# Patient Record
Sex: FEMALE | Race: WHITE | NOT HISPANIC OR LATINO | Employment: FULL TIME | URBAN - METROPOLITAN AREA
[De-identification: names, ages, dates, MRNs, and addresses within clinical notes are randomized per-mention and may not be internally consistent; named-entity substitution may affect disease eponyms.]

---

## 2017-05-08 DIAGNOSIS — N20.0 KIDNEY STONE: ICD-10-CM

## 2017-05-09 RX ORDER — HYDROCHLOROTHIAZIDE 25 MG/1
TABLET ORAL
Qty: 30 TABLET | Refills: 0 | Status: SHIPPED | OUTPATIENT
Start: 2017-05-09 | End: 2017-06-10

## 2017-05-09 NOTE — TELEPHONE ENCOUNTER
Medication is being filled for 1 time refill only due to:  Patient needs labs non-fasting labs. Patient needs to be seen because it has been more than one year since last visit.      HCTZ      Last Written Prescription Date: 3/31/16  Last Fill Quantity: 90, # refills: 3  Last Office Visit with [facility], [facility] or [facility] prescribing provider: 3/31/16       Potassium   Date Value Ref Range Status   03/31/2016 3.4 3.4 - 5.3 mmol/L Final     Creatinine   Date Value Ref Range Status   03/31/2016 0.75 0.52 - 1.04 mg/dL Final     BP Readings from Last 3 Encounters:   03/31/16 104/70   10/21/14 104/52   02/10/10 102/64

## 2017-06-08 DIAGNOSIS — N20.0 KIDNEY STONE: ICD-10-CM

## 2017-06-08 RX ORDER — HYDROCHLOROTHIAZIDE 25 MG/1
TABLET ORAL
Qty: 30 TABLET | Refills: 0 | OUTPATIENT
Start: 2017-06-08

## 2017-06-08 NOTE — TELEPHONE ENCOUNTER
Routing refill request to provider for review/approval because:  Anabella given x1 and patient did not follow up, please advise  Labs not current:  bmp  Patient needs to be seen because it has been more than 1 year since last office visit.  Last seen 3/2016

## 2017-06-10 DIAGNOSIS — N20.0 KIDNEY STONE: ICD-10-CM

## 2017-06-10 RX ORDER — HYDROCHLOROTHIAZIDE 25 MG/1
TABLET ORAL
Qty: 30 TABLET | Refills: 0 | Status: CANCELLED | OUTPATIENT
Start: 2017-06-10

## 2017-06-10 RX ORDER — HYDROCHLOROTHIAZIDE 25 MG/1
TABLET ORAL
Qty: 30 TABLET | Refills: 0 | Status: SHIPPED | OUTPATIENT
Start: 2017-06-10 | End: 2017-06-29

## 2017-06-11 NOTE — TELEPHONE ENCOUNTER
Call pt. Overdue for appt with MD  to follow-up on his/her  Hx kidney stones, hydrochlorothiazide use and lab monitoring. Medications will be refilled for 30 days and no additional refill at this time. Pt to schedule an appointment for nonfasting labs in the next 2 weeks and appt to see me in the next 30 days (after labs done) to review results and follow-up on medical status . Additional   longterm refills will be addressed at that follow-up appointment in clinic.

## 2017-06-29 ENCOUNTER — OFFICE VISIT (OUTPATIENT)
Dept: INTERNAL MEDICINE | Facility: CLINIC | Age: 47
End: 2017-06-29
Payer: COMMERCIAL

## 2017-06-29 VITALS
TEMPERATURE: 99 F | HEART RATE: 87 BPM | DIASTOLIC BLOOD PRESSURE: 72 MMHG | OXYGEN SATURATION: 97 % | WEIGHT: 183 LBS | HEIGHT: 66 IN | BODY MASS INDEX: 29.41 KG/M2 | SYSTOLIC BLOOD PRESSURE: 104 MMHG

## 2017-06-29 DIAGNOSIS — Z13.6 CARDIOVASCULAR SCREENING; LDL GOAL LESS THAN 130: Primary | ICD-10-CM

## 2017-06-29 DIAGNOSIS — Z86.19 HISTORY OF COLD SORES: ICD-10-CM

## 2017-06-29 DIAGNOSIS — N20.0 KIDNEY STONE: ICD-10-CM

## 2017-06-29 LAB
ANION GAP SERPL CALCULATED.3IONS-SCNC: 7 MMOL/L (ref 3–14)
BUN SERPL-MCNC: 13 MG/DL (ref 7–30)
CALCIUM SERPL-MCNC: 8.8 MG/DL (ref 8.5–10.1)
CHLORIDE SERPL-SCNC: 104 MMOL/L (ref 94–109)
CHOLEST SERPL-MCNC: 179 MG/DL
CO2 SERPL-SCNC: 27 MMOL/L (ref 20–32)
CREAT SERPL-MCNC: 0.83 MG/DL (ref 0.52–1.04)
GFR SERPL CREATININE-BSD FRML MDRD: 74 ML/MIN/1.7M2
GLUCOSE SERPL-MCNC: 104 MG/DL (ref 70–99)
HDLC SERPL-MCNC: 42 MG/DL
LDLC SERPL CALC-MCNC: 112 MG/DL
NONHDLC SERPL-MCNC: 137 MG/DL
POTASSIUM SERPL-SCNC: 3.5 MMOL/L (ref 3.4–5.3)
SODIUM SERPL-SCNC: 138 MMOL/L (ref 133–144)
TRIGL SERPL-MCNC: 125 MG/DL

## 2017-06-29 PROCEDURE — 99213 OFFICE O/P EST LOW 20 MIN: CPT | Performed by: INTERNAL MEDICINE

## 2017-06-29 PROCEDURE — 80061 LIPID PANEL: CPT | Performed by: INTERNAL MEDICINE

## 2017-06-29 PROCEDURE — 36415 COLL VENOUS BLD VENIPUNCTURE: CPT | Performed by: INTERNAL MEDICINE

## 2017-06-29 PROCEDURE — 80048 BASIC METABOLIC PNL TOTAL CA: CPT | Performed by: INTERNAL MEDICINE

## 2017-06-29 RX ORDER — VALACYCLOVIR HYDROCHLORIDE 1 G/1
2000 TABLET, FILM COATED ORAL 2 TIMES DAILY
Qty: 30 TABLET | Refills: 2 | Status: SHIPPED | OUTPATIENT
Start: 2017-06-29 | End: 2018-08-09

## 2017-06-29 RX ORDER — HYDROCHLOROTHIAZIDE 25 MG/1
TABLET ORAL
Qty: 90 TABLET | Refills: 3 | Status: SHIPPED | OUTPATIENT
Start: 2017-06-29 | End: 2018-07-03

## 2017-06-29 NOTE — PROGRESS NOTES
"  SUBJECTIVE:                                                    Rakel Davila is a 47 year old female who presents to clinic today for the following health issues:      Medication Renewal    Amount of exercise or physical activity: 4-5 days/week for an average of 15-30 minutes    Problems taking medications regularly: No    Medication side effects: none    Diet: low salt    Pt's past medical history, family history, habits, medications and allergies were reviewed with the patient today.  See snap shot for  HCM status. Most recent lab results reviewed with pt. Problem list and histories reviewed & adjusted, as indicated.  Additional history as below:    Denies chest pain, shortness of breath, abdominal pain, headache, vision changes or side effects with medications.  Nephrology note from Dec 2016 reviewed.  Sees [facility] annually and had exam  Last Fall. Not sure if had pap then. Last done Spring 2014 per abstracted info.  NO recurrence of kidney stones. Rare use of Valtrex for cold sores. No sx now     Additional ROS:   Constitutional, HEENT, Cardiovascular, Pulmonary, GI and , Neuro, MSK and Psych review of systems/symptoms are otherwise negative or unchanged from previous, except as noted above.      OBJECTIVE:  /72  Pulse 87  Temp 99  F (37.2  C) (Oral)  Ht 5' 6\" (1.676 m)  Wt 183 lb (83 kg)  LMP 06/08/2017 (Within Days)  SpO2 97%  BMI 29.54 kg/m2   Estimated body mass index is 29.54 kg/(m^2) as calculated from the following:    Height as of this encounter: 5' 6\" (1.676 m).    Weight as of this encounter: 183 lb (83 kg).  Eye: PERRL, EOMI  HENT: ear canals and TM's normal and nose and mouth without ulcers or lesions   Neck: no adenopathy. Thyroid normal to palpation. No bruits  Pulm: Lungs clear to auscultation   CV: Regular rates and rhythm  GI: Soft, nontender, Normal active bowel sounds, No hepatosplenomegaly or masses palpable  Ext: Peripheral pulses intact. No edema.  Neuro: Normal " strength and tone, sensory exam grossly normal    Assessment/Plan: (See plan discussion below for further details)  1. Kidney stone   No sx  Continue meds  - hydrochlorothiazide (HYDRODIURIL) 25 MG tablet; TAKE 1 TABLET(25 MG) BY MOUTH DAILY.  Dispense: 90 tablet; Refill: 3  - Basic metabolic panel    2. History of cold sores   Rare episodes. Contrinue prn treatment. No sx now  - valACYclovir (VALTREX) 1000 mg tablet; Take 2 tablets (2,000 mg) by mouth 2 times daily for 1 day for cold sore outbreaks  Dispense: 30 tablet; Refill: 2    3. CARDIOVASCULAR SCREENING; LDL GOAL LESS THAN 130  - Lipid panel reflex to direct LDL    Plan discussion:  Continue current meds  Prescriptions refilled.    Labs today as ordered  See GYN this year as scheduled. If pap was not done last year, then ask to have results sent to Tawanna Shanks MD  Internal Medicine Department  [facility]

## 2017-06-29 NOTE — MR AVS SNAPSHOT
"              After Visit Summary   6/29/2017    Rakel Davila    MRN: [id number]           Patient Information     Date Of Birth          1970        Visit Information        Provider Department      6/29/2017 7:30 AM Jann Shanks MD [facility]        Today's Diagnoses     CARDIOVASCULAR SCREENING; LDL GOAL LESS THAN 130    -  1    Kidney stone        History of cold sores           Follow-ups after your visit        Who to contact     If you have questions or need follow up information about today's clinic visit or your schedule please contact [facility] directly at [phone].  Normal or non-critical lab and imaging results will be communicated to you by [employer], letter or phone within 4 business days after the clinic has received the results. If you do not hear from us within 7 days, please contact the clinic through [employer] or phone. If you have a critical or abnormal lab result, we will notify you by phone as soon as possible.  Submit refill requests through [employer] or call your pharmacy and they will forward the refill request to us. Please allow 3 business days for your refill to be completed.          Additional Information About Your Visit        MyChart Information     [employer] gives you secure access to your electronic health record. If you see a primary care provider, you can also send messages to your care team and make appointments. If you have questions, please call your primary care clinic.  If you do not have a primary care provider, please call [phone] and they will assist you.        Care EveryWhere ID     This is your Care EveryWhere ID. This could be used by other organizations to access your [place] medical records  [id number]        Your Vitals Were     Pulse Temperature Height Last Period Pulse Oximetry BMI (Body Mass Index)    87 99  F (37.2  C) (Oral) 5' 6\" (1.676 m) 06/08/2017 (Within Days) 97% 29.54 kg/m2       " Blood Pressure from Last 3 Encounters:   06/29/17 104/72   03/31/16 104/70   10/21/14 104/52    Weight from Last 3 Encounters:   06/29/17 183 lb (83 kg)   03/31/16 181 lb (82.1 kg)   10/21/14 177 lb (80.3 kg)              We Performed the Following     Basic metabolic panel     Lipid panel reflex to direct LDL          Today's Medication Changes          These changes are accurate as of: 6/29/17  8:37 AM.  If you have any questions, ask your nurse or doctor.               These medicines have changed or have updated prescriptions.        Dose/Directions    hydrochlorothiazide 25 MG tablet   Commonly known as:  HYDRODIURIL   This may have changed:  additional instructions   Used for:  Kidney stone   Changed by:  Jann Shanks MD        TAKE 1 TABLET(25 MG) BY MOUTH DAILY.   Quantity:  90 tablet   Refills:  3       valACYclovir 1000 mg tablet   Commonly known as:  VALTREX   This may have changed:  additional instructions   Used for:  History of cold sores   Changed by:  Jann Shanks MD        Dose:  2000 mg   Take 2 tablets (2,000 mg) by mouth 2 times daily for 1 day for cold sore outbreaks   Quantity:  30 tablet   Refills:  2         Stop taking these medicines if you haven't already. Please contact your care team if you have questions.     meclizine 25 MG tablet   Commonly known as:  ANTIVERT   Stopped by:  Jann Shanks MD                Where to get your medicines      [facility] [address] [place] [address] [address] [facility] [address]  [facility], [facility] [address]    Hours:  24-hours Phone:  [phone]     hydrochlorothiazide 25 MG tablet    valACYclovir 1000 mg tablet                Primary Care Provider Office Phone # Fax #    Jann Shanks [phone]       [facility] [address] [facility] [address]        Equal Access to Services     IRIS KING AH: Shaun Caldera, juma haas, jesseybcaesar  meaghan cheungrita granado ah. So [facility] [phone].    ATENCIÓN: Si anne gonzales, tiene a khanna disposición servicios gratuitos de asistencia lingüística. Flory al [phone].    We comply with applicable federal civil rights laws and [place] laws. We do not discriminate on the basis of race, color, national origin, age, disability sex, sexual orientation or gender identity.            Thank you!     Thank you for choosing [facility]  for your care. Our goal is always to provide you with excellent care. Hearing back from our patients is one way we can continue to improve our services. Please take a few minutes to complete the written survey that you may receive in the mail after your visit with us. Thank you!             Your Updated Medication List - Protect others around you: Learn how to safely use, store and throw away your medicines at www.disposemymeds.org.          This list is accurate as of: 6/29/17  8:37 AM.  Always use your most recent med list.                   Brand Name Dispense Instructions for use Diagnosis    hydrochlorothiazide 25 MG tablet    HYDRODIURIL    90 tablet    TAKE 1 TABLET(25 MG) BY MOUTH DAILY.    Kidney stone       valACYclovir 1000 mg tablet    VALTREX    30 tablet    Take 2 tablets (2,000 mg) by mouth 2 times daily for 1 day for cold sore outbreaks    History of cold sores

## 2017-06-29 NOTE — NURSING NOTE
"Chief Complaint   Patient presents with     Refill Request       Initial /72  Pulse 87  Temp 99  F (37.2  C) (Oral)  Ht 5' 6\" (1.676 m)  Wt 183 lb (83 kg)  LMP 06/08/2017 (Within Days)  SpO2 97%  BMI 29.54 kg/m2 Estimated body mass index is 29.54 kg/(m^2) as calculated from the following:    Height as of this encounter: 5' 6\" (1.676 m).    Weight as of this encounter: 183 lb (83 kg).  Medication Reconciliation: complete  "

## 2017-06-30 ENCOUNTER — MYC MEDICAL ADVICE (OUTPATIENT)
Dept: URGENT CARE | Facility: URGENT CARE | Age: 47
End: 2017-06-30

## 2017-06-30 DIAGNOSIS — E78.5 HYPERLIPIDEMIA LDL GOAL <130: ICD-10-CM

## 2017-06-30 DIAGNOSIS — N20.0 KIDNEY STONE: Primary | ICD-10-CM

## 2018-03-23 ENCOUNTER — HOSPITAL ENCOUNTER (OUTPATIENT)
Dept: MAMMOGRAPHY | Facility: CLINIC | Age: 48
Discharge: HOME | End: 2018-03-23
Attending: REGISTERED NURSE | Admitting: REGISTERED NURSE
Payer: COMMERCIAL

## 2018-03-23 DIAGNOSIS — Z12.31 VISIT FOR SCREENING MAMMOGRAM: ICD-10-CM

## 2018-03-23 PROCEDURE — 77067 SCR MAMMO BI INCL CAD: CPT

## 2018-07-03 DIAGNOSIS — N20.0 KIDNEY STONE: ICD-10-CM

## 2018-07-03 RX ORDER — HYDROCHLOROTHIAZIDE 25 MG/1
TABLET ORAL
Qty: 30 TABLET | Refills: 0 | Status: SHIPPED | OUTPATIENT
Start: 2018-07-03 | End: 2018-08-02

## 2018-07-03 NOTE — LETTER
[facility]  [address] [address]  [phone]            Rakel Davila  [address]  [facility] [address]        July 3, 2018    Dear Brittaney,    While refilling your prescription today, we noticed that you are due for an appointment with your provider.  We will refill your prescription for 30 days, but a follow-up appointment must be made before any additional refills can be approved.     Taking care of your health is important to us and we look forward to seeing you in the near future.  Please call us at [phone] or [phone] (or use [employer]) to schedule an appointment.     Please disregard this notice if you have already made an appointment.    Sincerely,        [facility]

## 2018-07-03 NOTE — TELEPHONE ENCOUNTER
"Requested Prescriptions   Pending Prescriptions Disp Refills     hydrochlorothiazide (HYDRODIURIL) 25 MG tablet [Pharmacy Med Name: HYDROCHLOROTHIAZIDE 25MG TABLETS] 90 tablet 0     Sig: TAKE 1 TABLET(25 MG) BY MOUTH DAILY    Diuretics (Including Combos) Protocol Failed    7/3/2018 11:01 AM       Failed - Blood pressure under 140/90 in past 12 months    BP Readings from Last 3 Encounters:   06/29/17 104/72   03/31/16 104/70   10/21/14 104/52                Failed - Recent (12 mo) or future (30 days) visit within the authorizing provider's specialty    Patient had office visit in the last 12 months or has a visit in the next 30 days with authorizing provider or within the authorizing provider's specialty.  See \"Patient Info\" tab in inbasket, or \"Choose Columns\" in Meds & Orders section of the refill encounter.           Failed - Normal serum creatinine on file in past 12 months    Recent Labs   Lab Test  06/29/17   0802   CR  0.83             Failed - Normal serum potassium on file in past 12 months    Recent Labs   Lab Test  06/29/17   0802   POTASSIUM  3.5                   Failed - Normal serum sodium on file in past 12 months    Recent Labs   Lab Test  06/29/17   0802   NA  138             Passed - Patient is age 18 or older       Passed - No active pregancy on record       Passed - No positive pregnancy test in past 12 months        Last Written Prescription Date:  6/29/17  Last Fill Quantity: 90,  # refills: 3   Last office visit: 6/29/2017 with prescribing provider:  6/29/17   Future Office Visit:      "

## 2018-07-12 ENCOUNTER — MYC MEDICAL ADVICE (OUTPATIENT)
Dept: INTERNAL MEDICINE | Facility: CLINIC | Age: 48
End: 2018-07-12

## 2018-08-02 DIAGNOSIS — N20.0 KIDNEY STONE: ICD-10-CM

## 2018-08-02 RX ORDER — HYDROCHLOROTHIAZIDE 25 MG/1
TABLET ORAL
Qty: 8 TABLET | Refills: 0 | Status: SHIPPED | OUTPATIENT
Start: 2018-08-02 | End: 2018-08-09

## 2018-08-02 NOTE — TELEPHONE ENCOUNTER
Patient called and was wondering if she could get a few hydrochlorothiazide tablets until her appt  8/9 with dr. Shanks.  She will be out in a few days.  Home # [phone]   Pharmacy #[phone]

## 2018-08-03 DIAGNOSIS — N20.0 KIDNEY STONE: ICD-10-CM

## 2018-08-03 RX ORDER — HYDROCHLOROTHIAZIDE 25 MG/1
TABLET ORAL
Qty: 30 TABLET | Refills: 0 | OUTPATIENT
Start: 2018-08-03

## 2018-08-03 NOTE — TELEPHONE ENCOUNTER
"Requested Prescriptions   Pending Prescriptions Disp Refills     hydrochlorothiazide (HYDRODIURIL) 25 MG tablet [Pharmacy Med Name: HYDROCHLOROTHIAZIDE 25MG TABLETS] 30 tablet 0    Last Written Prescription Date:  08/02/2018  Last Fill Quantity: 8,  # refills: 0   Last office visit: 6/29/2017 with prescribing provider:  Dimitris    Future Office Visit:   Next 5 appointments (look out 90 days)     Aug 09, 2018  1:30 PM CDT   PHYSICAL with Jann Shanks MD   [facility]    [address] [place] [address] [phone]                  Sig: TAKE 1 TABLET BY MOUTH EVERY DAY    Diuretics (Including Combos) Protocol Failed    8/3/2018  7:32 AM       Failed - Blood pressure under 140/90 in past 12 months    BP Readings from Last 3 Encounters:   06/29/17 104/72   03/31/16 104/70   10/21/14 104/52                Failed - Normal serum creatinine on file in past 12 months    Recent Labs   Lab Test  06/29/17   0802   CR  0.83             Failed - Normal serum potassium on file in past 12 months    Recent Labs   Lab Test  06/29/17   0802   POTASSIUM  3.5                   Failed - Normal serum sodium on file in past 12 months    Recent Labs   Lab Test  06/29/17   0802   NA  138             Passed - Recent (12 mo) or future (30 days) visit within the authorizing provider's specialty    Patient had office visit in the last 12 months or has a visit in the next 30 days with authorizing provider or within the authorizing provider's specialty.  See \"Patient Info\" tab in inbasket, or \"Choose Columns\" in Meds & Orders section of the refill encounter.           Passed - Patient is age 18 or older       Passed - No active pregancy on record       Passed - No positive pregnancy test in past 12 months          "

## 2018-08-04 ENCOUNTER — HEALTH MAINTENANCE LETTER (OUTPATIENT)
Age: 48
End: 2018-08-04

## 2018-08-09 ENCOUNTER — OFFICE VISIT (OUTPATIENT)
Dept: INTERNAL MEDICINE | Facility: CLINIC | Age: 48
End: 2018-08-09
Payer: COMMERCIAL

## 2018-08-09 VITALS
WEIGHT: 187 LBS | DIASTOLIC BLOOD PRESSURE: 72 MMHG | OXYGEN SATURATION: 99 % | RESPIRATION RATE: 16 BRPM | TEMPERATURE: 98.7 F | SYSTOLIC BLOOD PRESSURE: 108 MMHG | HEART RATE: 88 BPM | BODY MASS INDEX: 30.05 KG/M2 | HEIGHT: 66 IN

## 2018-08-09 DIAGNOSIS — E66.811 CLASS 1 OBESITY WITHOUT SERIOUS COMORBIDITY WITH BODY MASS INDEX (BMI) OF 30.0 TO 30.9 IN ADULT, UNSPECIFIED OBESITY TYPE: ICD-10-CM

## 2018-08-09 DIAGNOSIS — N20.0 KIDNEY STONE: ICD-10-CM

## 2018-08-09 DIAGNOSIS — Z86.19 HISTORY OF COLD SORES: ICD-10-CM

## 2018-08-09 DIAGNOSIS — Z00.01 ENCOUNTER FOR ROUTINE ADULT MEDICAL EXAM WITH ABNORMAL FINDINGS: Primary | ICD-10-CM

## 2018-08-09 DIAGNOSIS — E78.5 HYPERLIPIDEMIA LDL GOAL <130: ICD-10-CM

## 2018-08-09 PROCEDURE — 99396 PREV VISIT EST AGE 40-64: CPT | Performed by: INTERNAL MEDICINE

## 2018-08-09 RX ORDER — HYDROCHLOROTHIAZIDE 25 MG/1
TABLET ORAL
Qty: 8 TABLET | Refills: 0 | Status: CANCELLED | OUTPATIENT
Start: 2018-08-09

## 2018-08-09 RX ORDER — VALACYCLOVIR HYDROCHLORIDE 1 G/1
2000 TABLET, FILM COATED ORAL 2 TIMES DAILY
Qty: 30 TABLET | Refills: 2 | Status: SHIPPED | OUTPATIENT
Start: 2018-08-09 | End: 2019-09-23

## 2018-08-09 RX ORDER — HYDROCHLOROTHIAZIDE 25 MG/1
TABLET ORAL
Qty: 90 TABLET | Refills: 3 | Status: SHIPPED | OUTPATIENT
Start: 2018-08-09 | End: 2019-08-07

## 2018-08-09 NOTE — MR AVS SNAPSHOT
After Visit Summary   8/9/2018    Rakel Davila    MRN: [id number]           Patient Information     Date Of Birth          1970        Visit Information        Provider Department      8/9/2018 1:30 PM Jann Shanks MD [facility]        Today's Diagnoses     Screening for malignant neoplasm of cervix        Screening for HIV (human immunodeficiency virus)        History of cold sores        Kidney stone          Care Instructions    Continue current meds  Prescriptions refilled.    Call  [phone] or use [employer] to schedule a future lab appointment  fasting in 1-2 weeks.   Calorie/carbohydrate (sugar, bread, potato, pasta, etc) reduction in diet for weight loss.  Increase color on your plate with fruits and vegetables. Increase  frequency of walking or other aerobic exercise as able (goal is daily)  Pap/pelvic exam with GYN. Ask them to fax us the results of pap/HPV          Follow-ups after your visit        Who to contact     If you have questions or need follow up information about today's clinic visit or your schedule please contact [facility] directly at [phone].  Normal or non-critical lab and imaging results will be communicated to you by [employer], letter or phone within 4 business days after the clinic has received the results. If you do not hear from us within 7 days, please contact the clinic through [employer] or phone. If you have a critical or abnormal lab result, we will notify you by phone as soon as possible.  Submit refill requests through [employer] or call your pharmacy and they will forward the refill request to us. Please allow 3 business days for your refill to be completed.          Additional Information About Your Visit        [employer] Information     [employer] gives you secure access to your electronic health record. If you see a primary care provider, you can also send messages to your care team and make  "appointments. If you have questions, please call your primary care clinic.  If you do not have a primary care provider, please call [phone] and they will assist you.        Care EveryWhere ID     This is your Care EveryWhere ID. This could be used by other organizations to access your [place] medical records  [id number]        Your Vitals Were     Pulse Temperature Respirations Height Pulse Oximetry BMI (Body Mass Index)    88 98.7  F (37.1  C) (Oral) 16 5' 5.5\" (1.664 m) 99% 30.65 kg/m2       Blood Pressure from Last 3 Encounters:   08/09/18 108/72   06/29/17 104/72   03/31/16 104/70    Weight from Last 3 Encounters:   08/09/18 187 lb (84.8 kg)   06/29/17 183 lb (83 kg)   03/31/16 181 lb (82.1 kg)              Today, you had the following     No orders found for display         Where to get your medicines      These medications were sent to [facility] [address] [place] [address] [address] AT [place] [address] [address], [facility] [address]     Phone:  [phone]     hydrochlorothiazide 25 MG tablet    valACYclovir 1000 mg tablet          Primary Care Provider Office Phone # Fax #    Jann Shanks [phone]       [address] [facility] [address]        Equal Access to Services     IRIS KING AH: Hadii jessica ku hadasho Soomaali, waaxda luqadaha, qaybta kaalmada adeegyada, meaghna jim. So [facility] [phone].    ATENCIÓN: Si habla español, tiene a khanna disposición servicios gratuitos de asistencia lingüística. Flory mallory [phone].    We comply with applicable federal civil rights laws and [place] laws. We do not discriminate on the basis of race, color, national origin, age, disability, sex, sexual orientation, or gender identity.            Thank you!     Thank you for choosing [facility]  for your care. Our goal is always to provide you with excellent care. Hearing back from our patients is one way " we can continue to improve our services. Please take a few minutes to complete the written survey that you may receive in the mail after your visit with us. Thank you!             Your Updated Medication List - Protect others around you: Learn how to safely use, store and throw away your medicines at www.disposemymeds.org.          This list is accurate as of 8/9/18  2:00 PM.  Always use your most recent med list.                   Brand Name Dispense Instructions for use Diagnosis    hydrochlorothiazide 25 MG tablet    HYDRODIURIL    90 tablet    TAKE 1 TABLET(25 MG) BY MOUTH DAILY    Kidney stone       valACYclovir 1000 mg tablet    VALTREX    30 tablet    Take 2 tablets (2,000 mg) by mouth 2 times daily for 1 day for cold sore outbreaks    History of cold sores

## 2018-08-09 NOTE — PROGRESS NOTES
SUBJECTIVE:   CC: Rakel Davila is an 48 year old woman who presents for preventive health visit.     Healthy Habits:  Answers for HPI/ROS submitted by the patient on 8/9/2018   Annual Exam:  Getting at least 3 servings of Calcium per day:: Yes  Bi-annual eye exam:: Yes  Dental care twice a year:: Yes  Sleep apnea or symptoms of sleep apnea:: None  Diet:: Low salt  Frequency of exercise:: 2-3 days/week  Taking medications regularly:: Yes  Medication side effects:: None  Additional concerns today:: YES  PHQ-2 Score: 0  Duration of exercise:: 15-30 minutes        Today's PHQ-2 Score:   PHQ-2 ( 1999 [employer]) 8/9/2018 6/29/2017   Q1: Little interest or pleasure in doing things 0 0   Q2: Feeling down, depressed or hopeless 0 0   PHQ-2 Score 0 0   Q1: Little interest or pleasure in doing things Not at all -   Q2: Feeling down, depressed or hopeless Not at all -   PHQ-2 Score 0 -       Abuse: Current or Past(Physical, Sexual or Emotional)- No  Do you feel safe in your environment - Yes    Social History   Substance Use Topics     Smoking status: Never Smoker     Smokeless tobacco: Never Used     Alcohol use Yes      Comment: four per month     If you drink alcohol do you typically have >3 drinks per day or >7 drinks per week? No                     Reviewed orders with patient.  Reviewed health maintenance and updated orders accordingly - Yes  Labs reviewed in EPIC    Patient under age 50, mutual decision reflected in health maintenance.      Pertinent mammograms are reviewed under the imaging tab.  History of abnormal Pap smear: NO - age 30-65 PAP every 5 years with negative HPV co-testing recommended     Reviewed and updated as needed this visit by clinical staff  Tobacco  Meds  Soc Hx        Reviewed and updated as needed this visit by Provider            ROS:  CONSTITUTIONAL: NEGATIVE for fever, chills. Weight up 4 pounds  INTEGUMENTARU/SKIN: NEGATIVE for worrisome rashes, moles or lesions  EYES: NEGATIVE  "for vision changes or irritation. Eye exam 1 year ago  ENT: NEGATIVE for ear, mouth and throat problems  RESP: NEGATIVE for significant cough or SOB  BREAST: NEGATIVE for masses, tenderness or discharge  CV: NEGATIVE for chest pain, palpitations or peripheral edema.    GI: NEGATIVE for nausea, abdominal pain  or change in bowel habits. Rare GERD with certain food  : NEGATIVE for unusual urinary or vaginal symptoms. Periods are regular. Overdue for GYN/PAP. Sees [facility]  MUSCULOSKELETAL: NEGATIVE for significant arthralgias or myalgia  NEURO: NEGATIVE for weakness, dizziness or paresthesias  PSYCHIATRIC: NEGATIVE for changes in mood or affect    OBJECTIVE:   /72  Pulse 88  Temp 98.7  F (37.1  C) (Oral)  Resp 16  Ht 5' 5.5\" (1.664 m)  Wt 187 lb (84.8 kg)  SpO2 99%  BMI 30.65 kg/m2  EXAM:  General appearance - healthy, alert, no distress  Skin - No rashes or lesions. Few seborrheic keratosis on trunk and legs  Head - normocephalic, atraumatic  Eyes - MUKESH, EOMI, fundi exam with nondilated pupils negative.  Ears - External ears normal. Canals clear. TM's normal.  Nose/Sinuses - Nares normal. Septum midline. Mucosa normal. No drainage or sinus tenderness.  Oropharynx - No erythema, no adenopathy, no exudates.  Neck - Supple without adenopathy or thyromegaly. No bruits.  Lungs - Clear to auscultation without wheezes/rhonchi.  Heart - Regular rate and rhythm without murmurs, clicks, or gallops.  Nodes - No supraclavicular, axillary, or inguinal adenopathy palpable.  Breasts - deferred  Abdomen - Abdomen soft, non-tender. BS normal. No masses or hepatosplenomegaly palpable. No bruits.  Extremities -No cyanosis, clubbing or edema.    Musculoskeletal - Spine ROM normal. Muscular strength intact.   Peripheral pulses - radial=4/4, femoral=4/4, posterior tibial=4/4, dorsalis pedis=4/4,  Neuro - Gait normal. Reflexes normal and symmetric. Sensation grossly WNL.  Genital/Rectal - deferred      ASSESSMENT/PLAN:    " "1. Encounter for routine adult medical exam with abnormal findings   Due for pap/pelvic. Will have done with GYN in the next month and have tem fax us results    2. Class 1 obesity without serious comorbidity with body mass index (BMI) of 30.0 to 30.9 in adult, unspecified obesity type  See below re: counselling    3. History of cold sores  NO sx now. Valtrex has worked well when occurs. RF done  - valACYclovir (VALTREX) 1000 mg tablet; Take 2 tablets (2,000 mg) by mouth 2 times daily for 1 day for cold sore outbreaks  Dispense: 30 tablet; Refill: 2    4. Kidney stone  No recurrence with preventative therapy.Maintaining good hydration. BMP lab in next 2 weeks as previously ordered  - hydrochlorothiazide (HYDRODIURIL) 25 MG tablet; TAKE 1 TABLET(25 MG) BY MOUTH DAILY  Dispense: 90 tablet; Refill: 3  hlorothiazide (HYDRODIURIL) 25 MG tablet; TAKE 1 TABLET(25 MG) BY MOUTH DAILY  Dispense: 90 tablet; Refill: 3    5. Hyperlipidemia LDL goal <130   Due for lab f/u. Orders previously done      COUNSELING:   Reviewed preventive health counseling, as reflected in patient instructions  Special attention given to:        Regular exercise       Healthy diet/nutrition    BP Readings from Last 1 Encounters:   08/09/18 108/72     Estimated body mass index is 30.65 kg/(m^2) as calculated from the following:    Height as of this encounter: 5' 5.5\" (1.664 m).    Weight as of this encounter: 187 lb (84.8 kg).      Weight management plan: Discussed healthy diet and exercise guidelines and patient will follow up in 12 months in clinic to re-evaluate.     reports that she has never smoked. She has never used smokeless tobacco.      Counseling Resources:  ATP IV Guidelines  Pooled Cohorts Equation Calculator  Breast Cancer Risk Calculator  FRAX Risk Assessment  ICSI Preventive Guidelines  Dietary Guidelines for Americans, 2010  USDA's MyPlate  ASA Prophylaxis  Lung CA Screening    PLAN:  Continue current meds  Prescriptions refilled.  "   Call  [phone] or use [facility] to schedule a future lab appointment  fasting in 1-2 weeks.   Calorie/carbohydrate (sugar, bread, potato, pasta, etc) reduction in diet for weight loss.  Increase color on your plate with fruits and vegetables. Increase  frequency of walking or other aerobic exercise as able (goal is daily)  Pap/pelvic exam with GYN. Ask them to fax us the results of pap/HPV         Jann Shanks MD  [facility]

## 2018-08-09 NOTE — PATIENT INSTRUCTIONS
Continue current meds  Prescriptions refilled.    Call  [phone] or use [facility] to schedule a future lab appointment  fasting in 1-2 weeks.   Calorie/carbohydrate (sugar, bread, potato, pasta, etc) reduction in diet for weight loss.  Increase color on your plate with fruits and vegetables. Increase  frequency of walking or other aerobic exercise as able (goal is daily)  Pap/pelvic exam with GYN. Ask them to fax us the results of pap/HPV

## 2018-08-09 NOTE — TELEPHONE ENCOUNTER
"Requested Prescriptions   Pending Prescriptions Disp Refills     hydrochlorothiazide (HYDRODIURIL) 25 MG tablet [Pharmacy Med Name: HYDROCHLOROTHIAZIDE 25MG TABLETS] 8 tablet 0    Last Written Prescription Date:  8/2/18  Last Fill Quantity: 8,  # refills: 0   Last office visit: No previous visit found with prescribing provider:  6/29/17   Future Office Visit:   Next 5 appointments (look out 90 days)     Aug 09, 2018  1:30 PM CDT   PHYSICAL with Jann Shanks MD   [facility]    [address] [place] [id number] [phone]                  Sig: TAKE 1 TABLET(25 MG) BY MOUTH DAILY    Diuretics (Including Combos) Protocol Failed    8/9/2018  3:11 AM       Failed - Blood pressure under 140/90 in past 12 months    BP Readings from Last 3 Encounters:   06/29/17 104/72   03/31/16 104/70   10/21/14 104/52                Failed - Normal serum creatinine on file in past 12 months    Recent Labs   Lab Test  06/29/17   0802   CR  0.83             Failed - Normal serum potassium on file in past 12 months    Recent Labs   Lab Test  06/29/17   0802   POTASSIUM  3.5                   Failed - Normal serum sodium on file in past 12 months    Recent Labs   Lab Test  06/29/17   0802   NA  138             Passed - Recent (12 mo) or future (30 days) visit within the authorizing provider's specialty    Patient had office visit in the last 12 months or has a visit in the next 30 days with authorizing provider or within the authorizing provider's specialty.  See \"Patient Info\" tab in inbasket, or \"Choose Columns\" in Meds & Orders section of the refill encounter.           Passed - Patient is age 18 or older       Passed - No active pregancy on record       Passed - No positive pregnancy test in past 12 months          "

## 2018-08-14 DIAGNOSIS — E78.5 HYPERLIPIDEMIA LDL GOAL <130: ICD-10-CM

## 2018-08-14 DIAGNOSIS — N20.0 KIDNEY STONE: ICD-10-CM

## 2018-08-14 LAB
ANION GAP SERPL CALCULATED.3IONS-SCNC: 8 MMOL/L (ref 3–14)
BUN SERPL-MCNC: 9 MG/DL (ref 7–30)
CALCIUM SERPL-MCNC: 8.4 MG/DL (ref 8.5–10.1)
CHLORIDE SERPL-SCNC: 102 MMOL/L (ref 94–109)
CHOLEST SERPL-MCNC: 190 MG/DL
CO2 SERPL-SCNC: 28 MMOL/L (ref 20–32)
CREAT SERPL-MCNC: 0.83 MG/DL (ref 0.52–1.04)
GFR SERPL CREATININE-BSD FRML MDRD: 73 ML/MIN/1.7M2
GLUCOSE SERPL-MCNC: 105 MG/DL (ref 70–99)
HDLC SERPL-MCNC: 38 MG/DL
LDLC SERPL CALC-MCNC: 125 MG/DL
NONHDLC SERPL-MCNC: 152 MG/DL
POTASSIUM SERPL-SCNC: 3.5 MMOL/L (ref 3.4–5.3)
SODIUM SERPL-SCNC: 138 MMOL/L (ref 133–144)
TRIGL SERPL-MCNC: 136 MG/DL

## 2018-08-14 PROCEDURE — 80061 LIPID PANEL: CPT | Performed by: INTERNAL MEDICINE

## 2018-08-14 PROCEDURE — 36415 COLL VENOUS BLD VENIPUNCTURE: CPT | Performed by: INTERNAL MEDICINE

## 2018-08-14 PROCEDURE — 80048 BASIC METABOLIC PNL TOTAL CA: CPT | Performed by: INTERNAL MEDICINE

## 2019-03-05 ENCOUNTER — TELEPHONE (OUTPATIENT)
Dept: INTERNAL MEDICINE | Facility: CLINIC | Age: 49
End: 2019-03-05

## 2019-03-05 NOTE — TELEPHONE ENCOUNTER
Panel Management Review    Patient Active Problem List   Diagnosis     Allergic rhinitis     Dyshidrosis     History of cold sores     Kidney stone     Vitamin D deficiency     Hyperlipidemia LDL goal <130       Patient has the following on her problem list: None      Composite cancer screening  Chart review shows that this patient is due/due soon for the following Pap Smear  Summary:    Patient is due/failing the following:   PAP    Action needed:   Patient needs office visit for PAP.    Type of outreach:    Phone, spoke to patient.  Patient is seen at [facility]    Questions for provider review:    None                                                                                                                                    Lucia Urias CMA       Chart routed to none .

## 2019-03-13 ENCOUNTER — TRANSFERRED RECORDS (OUTPATIENT)
Dept: HEALTH INFORMATION MANAGEMENT | Facility: CLINIC | Age: 49
End: 2019-03-13

## 2019-03-13 LAB
HPV ABSTRACT: NORMAL
PAP-ABSTRACT: NORMAL

## 2019-04-02 ENCOUNTER — TRANSFERRED RECORDS (OUTPATIENT)
Dept: HEALTH INFORMATION MANAGEMENT | Facility: CLINIC | Age: 49
End: 2019-04-02

## 2019-05-14 ENCOUNTER — HOSPITAL ENCOUNTER (OUTPATIENT)
Dept: MAMMOGRAPHY | Facility: CLINIC | Age: 49
Discharge: HOME | End: 2019-05-14
Attending: REGISTERED NURSE | Admitting: REGISTERED NURSE
Payer: COMMERCIAL

## 2019-05-14 DIAGNOSIS — Z12.39 BREAST CANCER SCREENING: ICD-10-CM

## 2019-05-14 PROCEDURE — 77063 BREAST TOMOSYNTHESIS BI: CPT

## 2019-08-07 DIAGNOSIS — N20.0 KIDNEY STONE: ICD-10-CM

## 2019-08-07 RX ORDER — HYDROCHLOROTHIAZIDE 25 MG/1
TABLET ORAL
Qty: 30 TABLET | Refills: 0 | Status: SHIPPED | OUTPATIENT
Start: 2019-08-07 | End: 2019-09-11

## 2019-08-07 NOTE — TELEPHONE ENCOUNTER
"Requested Prescriptions   Pending Prescriptions Disp Refills     hydrochlorothiazide (HYDRODIURIL) 25 MG tablet [Pharmacy Med Name: HYDROCHLOROTHIAZIDE 25MG TABLETS] 90 tablet 0     Sig: TAKE 1 TABLET(25 MG) BY MOUTH DAILY       Diuretics (Including Combos) Protocol Passed - 8/7/2019  6:40 AM        Passed - Blood pressure under 140/90 in past 12 months     BP Readings from Last 3 Encounters:   08/09/18 108/72   06/29/17 104/72   03/31/16 104/70                 Passed - Recent (12 mo) or future (30 days) visit within the authorizing provider's specialty     Patient had office visit in the last 12 months or has a visit in the next 30 days with authorizing provider or within the authorizing provider's specialty.  See \"Patient Info\" tab in inbasket, or \"Choose Columns\" in Meds & Orders section of the refill encounter.              Passed - Medication is active on med list        Passed - Patient is age 18 or older        Passed - No active pregancy on record        Passed - Normal serum creatinine on file in past 12 months     Recent Labs   Lab Test 08/14/18  0809   CR 0.83              Passed - Normal serum potassium on file in past 12 months     Recent Labs   Lab Test 08/14/18  0809   POTASSIUM 3.5                    Passed - Normal serum sodium on file in past 12 months     Recent Labs   Lab Test 08/14/18  0809                 Passed - No positive pregnancy test in past 12 months        Medication is being filled for 1 time refill only due to:  Patient needs to be seen because it has been more than one year since last visit.    "

## 2019-09-11 DIAGNOSIS — N20.0 KIDNEY STONE: ICD-10-CM

## 2019-09-11 RX ORDER — HYDROCHLOROTHIAZIDE 25 MG/1
TABLET ORAL
Qty: 30 TABLET | Refills: 0 | Status: SHIPPED | OUTPATIENT
Start: 2019-09-11 | End: 2019-09-23

## 2019-09-11 NOTE — TELEPHONE ENCOUNTER
"Requested Prescriptions   Pending Prescriptions Disp Refills     hydrochlorothiazide (HYDRODIURIL) 25 MG tablet [Pharmacy Med Name: HYDROCHLOROTHIAZIDE 25MG TABLETS] 30 tablet 0     Sig: TAKE 1 TABLET BY MOUTH DAILY       Diuretics (Including Combos) Protocol Failed - 9/11/2019  3:11 AM        Failed - Blood pressure under 140/90 in past 12 months     BP Readings from Last 3 Encounters:   08/09/18 108/72   06/29/17 104/72   03/31/16 104/70                 Failed - Normal serum creatinine on file in past 12 months     Recent Labs   Lab Test 08/14/18  0809   CR 0.83              Failed - Normal serum potassium on file in past 12 months     Recent Labs   Lab Test 08/14/18  0809   POTASSIUM 3.5                    Failed - Normal serum sodium on file in past 12 months     Recent Labs   Lab Test 08/14/18  0809                 Passed - Recent (12 mo) or future (30 days) visit within the authorizing provider's specialty     Patient had office visit in the last 12 months or has a visit in the next 30 days with authorizing provider or within the authorizing provider's specialty.  See \"Patient Info\" tab in inbasket, or \"Choose Columns\" in Meds & Orders section of the refill encounter.              Passed - Medication is active on med list        Passed - Patient is age 18 or older        Passed - No active pregancy on record        Passed - No positive pregnancy test in past 12 months      Routing refill request to provider for review/approval because:  Anabella given x1 and patient did not follow up, please advise  Patient needs to be seen because it has been more than 1 year since last office visit.        "

## 2019-09-23 ENCOUNTER — OFFICE VISIT (OUTPATIENT)
Dept: INTERNAL MEDICINE | Facility: CLINIC | Age: 49
End: 2019-09-23
Payer: COMMERCIAL

## 2019-09-23 ENCOUNTER — MYC MEDICAL ADVICE (OUTPATIENT)
Dept: INTERNAL MEDICINE | Facility: CLINIC | Age: 49
End: 2019-09-23

## 2019-09-23 VITALS
DIASTOLIC BLOOD PRESSURE: 80 MMHG | OXYGEN SATURATION: 97 % | HEIGHT: 65 IN | WEIGHT: 184 LBS | HEART RATE: 80 BPM | SYSTOLIC BLOOD PRESSURE: 120 MMHG | BODY MASS INDEX: 30.66 KG/M2 | RESPIRATION RATE: 16 BRPM | TEMPERATURE: 98.4 F

## 2019-09-23 DIAGNOSIS — N20.0 KIDNEY STONE: ICD-10-CM

## 2019-09-23 DIAGNOSIS — Z12.11 SPECIAL SCREENING FOR MALIGNANT NEOPLASMS, COLON: ICD-10-CM

## 2019-09-23 DIAGNOSIS — Z13.6 CARDIOVASCULAR SCREENING; LDL GOAL LESS THAN 100: ICD-10-CM

## 2019-09-23 DIAGNOSIS — Z86.19 HISTORY OF COLD SORES: ICD-10-CM

## 2019-09-23 DIAGNOSIS — E78.5 HYPERLIPIDEMIA LDL GOAL <130: Primary | ICD-10-CM

## 2019-09-23 DIAGNOSIS — Z00.01 ENCOUNTER FOR ROUTINE ADULT MEDICAL EXAM WITH ABNORMAL FINDINGS: Primary | ICD-10-CM

## 2019-09-23 DIAGNOSIS — Z13.1 SCREENING FOR DIABETES MELLITUS: ICD-10-CM

## 2019-09-23 LAB
ALBUMIN SERPL-MCNC: 3.6 G/DL (ref 3.4–5)
ALP SERPL-CCNC: 79 U/L (ref 40–150)
ALT SERPL W P-5'-P-CCNC: 22 U/L (ref 0–50)
ANION GAP SERPL CALCULATED.3IONS-SCNC: 2 MMOL/L (ref 3–14)
AST SERPL W P-5'-P-CCNC: 11 U/L (ref 0–45)
BILIRUB SERPL-MCNC: 0.3 MG/DL (ref 0.2–1.3)
BUN SERPL-MCNC: 13 MG/DL (ref 7–30)
CALCIUM SERPL-MCNC: 9.1 MG/DL (ref 8.5–10.1)
CHLORIDE SERPL-SCNC: 106 MMOL/L (ref 94–109)
CHOLEST SERPL-MCNC: 166 MG/DL
CO2 SERPL-SCNC: 30 MMOL/L (ref 20–32)
CREAT SERPL-MCNC: 0.8 MG/DL (ref 0.52–1.04)
GFR SERPL CREATININE-BSD FRML MDRD: 86 ML/MIN/{1.73_M2}
GLUCOSE SERPL-MCNC: 97 MG/DL (ref 70–99)
HDLC SERPL-MCNC: 35 MG/DL
LDLC SERPL CALC-MCNC: 112 MG/DL
NONHDLC SERPL-MCNC: 131 MG/DL
POTASSIUM SERPL-SCNC: 3.7 MMOL/L (ref 3.4–5.3)
PROT SERPL-MCNC: 7.3 G/DL (ref 6.8–8.8)
SODIUM SERPL-SCNC: 138 MMOL/L (ref 133–144)
TRIGL SERPL-MCNC: 96 MG/DL
TSH SERPL DL<=0.005 MIU/L-ACNC: 2.26 MU/L (ref 0.4–4)

## 2019-09-23 PROCEDURE — 99396 PREV VISIT EST AGE 40-64: CPT | Performed by: INTERNAL MEDICINE

## 2019-09-23 PROCEDURE — 80061 LIPID PANEL: CPT | Performed by: INTERNAL MEDICINE

## 2019-09-23 PROCEDURE — 36415 COLL VENOUS BLD VENIPUNCTURE: CPT | Performed by: INTERNAL MEDICINE

## 2019-09-23 PROCEDURE — 84443 ASSAY THYROID STIM HORMONE: CPT | Performed by: INTERNAL MEDICINE

## 2019-09-23 PROCEDURE — 80053 COMPREHEN METABOLIC PANEL: CPT | Performed by: INTERNAL MEDICINE

## 2019-09-23 RX ORDER — VALACYCLOVIR HYDROCHLORIDE 1 G/1
2000 TABLET, FILM COATED ORAL 2 TIMES DAILY
Qty: 30 TABLET | Refills: 2 | Status: SHIPPED | OUTPATIENT
Start: 2019-09-23 | End: 2020-11-10

## 2019-09-23 RX ORDER — HYDROCHLOROTHIAZIDE 25 MG/1
TABLET ORAL
Qty: 90 TABLET | Refills: 3 | Status: SHIPPED | OUTPATIENT
Start: 2019-09-23 | End: 2020-10-14

## 2019-09-23 ASSESSMENT — MIFFLIN-ST. JEOR: SCORE: 1455.5

## 2019-09-23 NOTE — PROGRESS NOTES
SUBJECTIVE:   CC: Rakel Davila is an 49 year old woman who presents for preventive health visit.     Healthy Habits:     Getting at least 3 servings of Calcium per day:  Yes    Bi-annual eye exam:  Yes    Dental care twice a year:  Yes    Sleep apnea or symptoms of sleep apnea:  None    Diet:  Low salt    Frequency of exercise:  2-3 days/week    Duration of exercise:  15-30 minutes    Taking medications regularly:  Yes    Medication side effects:  None    PHQ-2 Total Score: 1    Additional concerns today:  Yes          Today's PHQ-2 Score:   PHQ-2 ( 1999 Pfizer) 9/23/2019   Q1: Little interest or pleasure in doing things 0   Q2: Feeling down, depressed or hopeless 1   PHQ-2 Score 1   Q1: Little interest or pleasure in doing things Not at all   Q2: Feeling down, depressed or hopeless Several days   PHQ-2 Score 1       Abuse: Current or Past(Physical, Sexual or Emotional)- No  Do you feel safe in your environment? Yes    Social History     Tobacco Use     Smoking status: Never Smoker     Smokeless tobacco: Never Used   Substance Use Topics     Alcohol use: Yes     Comment: four per month     If you drink alcohol do you typically have >3 drinks per day or >7 drinks per week? No, Approximately 4 per month    Alcohol Use 9/23/2019   Prescreen: >3 drinks/day or >7 drinks/week? No   Prescreen: >3 drinks/day or >7 drinks/week? -     Reviewed orders with patient.  Reviewed health maintenance and updated orders accordingly - Yes  Labs reviewed in [facility]    Mammogram Screening: Patient over age 50, mutual decision to screen reflected in health maintenance.    Pertinent mammograms are reviewed under the imaging tab.  History of abnormal Pap smear:  Pap schedule per GYN     Reviewed and updated as needed this visit by clinical staff         Reviewed and updated as needed this visit by Provider            Review of Systems  CONSTITUTIONAL: NEGATIVE for fever, chills. Weight down 3 pounds with better diet and activity  "(partial Medifast)  INTEGUMENTARU/SKIN: NEGATIVE for worrisome rashes, moles or lesions  EYES: NEGATIVE for vision changes or irritation. Has readers.  Eye exam about 18 mos ago   ENT: NEGATIVE for ear, mouth and throat problems  RESP: NEGATIVE for significant cough or SOB  BREAST: NEGATIVE for masses, tenderness or discharge  CV: NEGATIVE for chest pain, palpitations or peripheral edema  GI: NEGATIVE for nausea, abdominal pain, heartburn, or change in bowel habits  : NEGATIVE for unusual urinary or vaginal symptoms. Periods are regular. Had abnormal pap April 2019 with GY and then had colposcopy that as \"OK\" per pt. NO records to review. HPV neg per pt. FRANCISCO signed to get records.  On hydrochlorothiazide for hx kidney stones. No recurrence with med  MUSCULOSKELETAL: NEGATIVE for significant arthralgias or myalgia  NEURO: NEGATIVE for weakness, dizziness or paresthesias  PSYCHIATRIC: NEGATIVE for changes in mood or affect     OBJECTIVE:   /80   Pulse 80   Temp 98.4  F (36.9  C) (Temporal)   Resp 16   Ht 1.651 m (5' 5\")   Wt 83.5 kg (184 lb)   LMP 09/14/2019   SpO2 97%   BMI 30.62 kg/m    Physical Exam  General appearance - healthy, alert, no distress  Skin - No rashes or lesions.  Head - normocephalic, atraumatic  Eyes - MUKESH, EOMI, fundi exam with nondilated pupils negative.  Ears - External ears normal. Canals clear. TM's normal.  Nose/Sinuses - Nares normal. Septum midline. Mucosa normal. No drainage or sinus tenderness.  Oropharynx - No erythema, no adenopathy, no exudates.  Neck - Supple without adenopathy or thyromegaly. No bruits.  Lungs - Clear to auscultation without wheezes/rhonchi.  Heart - Regular rate and rhythm without murmurs, clicks, or gallops.  Nodes - No supraclavicular, axillary, or inguinal adenopathy palpable.  Breasts - deferred  Abdomen - Abdomen mildly obese, soft, non-tender. BS normal. No masses or hepatosplenomegaly palpable. No bruits.  Extremities -No cyanosis, clubbing " "or edema.    Musculoskeletal - Spine ROM normal. Muscular strength intact.   Peripheral pulses - radial=4/4, femoral=4/4, posterior tibial=4/4, dorsalis pedis=4/4,  Neuro - Gait normal. Reflexes normal and symmetric. Sensation grossly WNL.  Genital/Rectal - deferred       ASSESSMENT/PLAN:   1. Encounter for routine adult medical exam with abnormal findings  Screening labs as ordered. Se below for other HCM  - Comprehensive metabolic panel  - TSH with free T4 reflex    2. Special screening for malignant neoplasms, colon  Due for colonoscopy screening  - GASTROENTEROLOGY ADULT REF PROCEDURE ONLY Other; [facility] [phone]    3. Screening for diabetes mellitus  - Comprehensive metabolic panel    4. CARDIOVASCULAR SCREENING; LDL GOAL LESS THAN 100  - Lipid panel reflex to direct LDL Fasting    5. Kidney stone   No recurrence with med. WIll continue current therapy and hydration  - hydrochlorothiazide (HYDRODIURIL) 25 MG tablet; TAKE 1 TABLET BY MOUTH DAILY  Dispense: 90 tablet; Refill: 3    6. History of cold sores  No sx currently. RF done for prn flare. Now rare  - valACYclovir (VALTREX) 1000 mg tablet; Take 2 tablets (2,000 mg) by mouth 2 times daily for 1 day for cold sore outbreaks  Dispense: 30 tablet; Refill: 2    COUNSELING:  Reviewed preventive health counseling, as reflected in patient instructions  Special attention given to:        Regular exercise       Healthy diet/nutrition    Estimated body mass index is 30.65 kg/m  as calculated from the following:    Height as of 8/9/18: 1.664 m (5' 5.5\").    Weight as of 8/9/18: 84.8 kg (187 lb).    Weight management plan: Discussed healthy diet and exercise guidelines     reports that she has never smoked. She has never used smokeless tobacco.      Counseling Resources:  ATP IV Guidelines  Pooled Cohorts Equation Calculator  Breast Cancer Risk Calculator  FRAX Risk Assessment  ICSI Preventive Guidelines  Dietary Guidelines for Americans, 2010  [employer]'s MyPlate  ASA " Prophylaxis  Lung CA Screening      PLAN:  Continue current meds  Prescriptions refilled.    Labs today as ordered  Colonoscopy  with  [facility]. They will call to schedule. If not heard from them in 1 week, then call [phone].   Check with insurance or speak with your pharmacist re: Shingrix vaccine coverage for shingles prevention.  This is a 2 shot series done 2-6 months apart  I would recommend you receive an influenza (flu) vaccine in the Fall  (October or November)  Will get records from GYN. FRANCISCO signed   Continue to reduce calorie/carbohydrate (sugar, bread, potato, pasta, rice, alcohol etc)  intake in diet.  Increase color on your plate with fruits and vegetables. Increase  frequency of walking or other aerobic exercise as able (goal is daily)      Jann Shanks MD  [facility]

## 2019-09-23 NOTE — PATIENT INSTRUCTIONS
Continue current meds  Prescriptions refilled.    Labs today as ordered  Colonoscopy  with  [facility]. They will call to schedule. If not heard from them in 1 week, then call [phone].   Check with insurance or speak with your pharmacist re: Shingrix vaccine coverage for shingles prevention.  This is a 2 shot series done 2-6 months apart  I would recommend you receive an influenza (flu) vaccine in the Fall  (October or November)  Will get records from GYN. FRANCISCO signed   Continue to reduce calorie/carbohydrate (sugar, bread, potato, pasta, rice, alcohol etc)  intake in diet.  Increase color on your plate with fruits and vegetables. Increase  frequency of walking or other aerobic exercise as able (goal is daily)

## 2019-10-02 ENCOUNTER — HEALTH MAINTENANCE LETTER (OUTPATIENT)
Age: 49
End: 2019-10-02

## 2019-10-15 ENCOUNTER — TRANSFERRED RECORDS (OUTPATIENT)
Dept: INTERNAL MEDICINE | Facility: CLINIC | Age: 49
End: 2019-10-15

## 2019-10-15 DIAGNOSIS — N20.0 KIDNEY STONE: ICD-10-CM

## 2019-10-15 RX ORDER — HYDROCHLOROTHIAZIDE 25 MG/1
TABLET ORAL
Qty: 90 TABLET | Refills: 3 | Status: CANCELLED | OUTPATIENT
Start: 2019-10-15

## 2019-10-15 NOTE — TELEPHONE ENCOUNTER
"Requested Prescriptions   Pending Prescriptions Disp Refills     hydrochlorothiazide (HYDRODIURIL) 25 MG tablet  Last Written Prescription Date:  09/23/2019  Last Fill Quantity: 90,  # refills: 03   Last Office Visit: 9/23/2019   Future Office Visit:      90 tablet 3     Sig: TAKE 1 TABLET BY MOUTH DAILY       Diuretics (Including Combos) Protocol Passed - 10/15/2019 11:04 AM        Passed - Blood pressure under 140/90 in past 12 months     BP Readings from Last 3 Encounters:   09/23/19 120/80   08/09/18 108/72   06/29/17 104/72                 Passed - Recent (12 mo) or future (30 days) visit within the authorizing provider's specialty     Patient has had an office visit with the authorizing provider or a provider within the authorizing providers department within the previous 12 mos or has a future within next 30 days. See \"Patient Info\" tab in inbasket, or \"Choose Columns\" in Meds & Orders section of the refill encounter.              Passed - Medication is active on med list        Passed - Patient is age 18 or older        Passed - No active pregancy on record        Passed - Normal serum creatinine on file in past 12 months     Recent Labs   Lab Test 09/23/19  0820   CR 0.80              Passed - Normal serum potassium on file in past 12 months     Recent Labs   Lab Test 09/23/19  0820   POTASSIUM 3.7                    Passed - Normal serum sodium on file in past 12 months     Recent Labs   Lab Test 09/23/19  0820                 Passed - No positive pregnancy test in past 12 months          "

## 2020-01-06 ENCOUNTER — TELEPHONE (OUTPATIENT)
Dept: INTERNAL MEDICINE | Facility: CLINIC | Age: 50
End: 2020-01-06

## 2020-01-06 NOTE — TELEPHONE ENCOUNTER
Spoke to patient. Copy of Gastro referral mailed to home address as directed by patient.  Susan Adam, CMA

## 2020-01-06 NOTE — TELEPHONE ENCOUNTER
Reason for Call:  Other     Detailed comments: Patient is requesting referral to send to [employer] for [facility].    Phone Number Patient can be reached at: Home number on file [phone] (home)    Best Time: anytime    Can we leave a detailed message on this number? YES    Call taken on 1/6/2020 at 3:46 PM by LINDA BAEZA

## 2020-01-07 ENCOUNTER — TRANSFERRED RECORDS (OUTPATIENT)
Dept: HEALTH INFORMATION MANAGEMENT | Facility: CLINIC | Age: 50
End: 2020-01-07

## 2020-10-12 DIAGNOSIS — N20.0 KIDNEY STONE: ICD-10-CM

## 2020-10-13 NOTE — TELEPHONE ENCOUNTER
hydrochlorothiazide    Routing refill request to provider for review/approval because:  Labs not current:  BMP  Patient needs to be seen because it has been more than 1 year since last office visit.

## 2020-10-14 RX ORDER — HYDROCHLOROTHIAZIDE 25 MG/1
TABLET ORAL
Qty: 90 TABLET | Refills: 0 | Status: SHIPPED | OUTPATIENT
Start: 2020-10-14 | End: 2020-11-10

## 2020-10-14 NOTE — TELEPHONE ENCOUNTER
Pt due for fasting labs and f/u with MD as  > 1 year since last appt. Pt to schedule fasting lab appt in the next month and then OK for virtual video  visit with me a few days later or may see me in clinic. Assist pt with scheduling appts. Med refilled for 90 days. Will address more RFs after appts

## 2020-11-05 DIAGNOSIS — E78.5 HYPERLIPIDEMIA LDL GOAL <130: ICD-10-CM

## 2020-11-05 DIAGNOSIS — N20.0 KIDNEY STONE: ICD-10-CM

## 2020-11-05 LAB
ANION GAP SERPL CALCULATED.3IONS-SCNC: 4 MMOL/L (ref 3–14)
BUN SERPL-MCNC: 12 MG/DL (ref 7–30)
CALCIUM SERPL-MCNC: 9.2 MG/DL (ref 8.5–10.1)
CHLORIDE SERPL-SCNC: 106 MMOL/L (ref 94–109)
CHOLEST SERPL-MCNC: 202 MG/DL
CO2 SERPL-SCNC: 28 MMOL/L (ref 20–32)
CREAT SERPL-MCNC: 0.81 MG/DL (ref 0.52–1.04)
GFR SERPL CREATININE-BSD FRML MDRD: 84 ML/MIN/{1.73_M2}
GLUCOSE SERPL-MCNC: 111 MG/DL (ref 70–99)
HDLC SERPL-MCNC: 37 MG/DL
LDLC SERPL CALC-MCNC: 143 MG/DL
NONHDLC SERPL-MCNC: 165 MG/DL
POTASSIUM SERPL-SCNC: 3.6 MMOL/L (ref 3.4–5.3)
SODIUM SERPL-SCNC: 138 MMOL/L (ref 133–144)
TRIGL SERPL-MCNC: 110 MG/DL

## 2020-11-05 PROCEDURE — 80061 LIPID PANEL: CPT | Performed by: INTERNAL MEDICINE

## 2020-11-05 PROCEDURE — 80048 BASIC METABOLIC PNL TOTAL CA: CPT | Performed by: INTERNAL MEDICINE

## 2020-11-05 PROCEDURE — 36415 COLL VENOUS BLD VENIPUNCTURE: CPT | Performed by: INTERNAL MEDICINE

## 2020-11-10 ENCOUNTER — VIRTUAL VISIT (OUTPATIENT)
Dept: INTERNAL MEDICINE | Facility: CLINIC | Age: 50
End: 2020-11-10
Payer: COMMERCIAL

## 2020-11-10 ENCOUNTER — HOSPITAL ENCOUNTER (OUTPATIENT)
Dept: MAMMOGRAPHY | Facility: CLINIC | Age: 50
Discharge: HOME | End: 2020-11-10
Attending: INTERNAL MEDICINE | Admitting: INTERNAL MEDICINE
Payer: COMMERCIAL

## 2020-11-10 DIAGNOSIS — R73.09 ELEVATED GLUCOSE: ICD-10-CM

## 2020-11-10 DIAGNOSIS — N20.0 KIDNEY STONE: ICD-10-CM

## 2020-11-10 DIAGNOSIS — Z87.42 HX OF ABNORMAL CERVICAL PAP SMEAR: ICD-10-CM

## 2020-11-10 DIAGNOSIS — Z12.31 VISIT FOR SCREENING MAMMOGRAM: ICD-10-CM

## 2020-11-10 DIAGNOSIS — E78.5 HYPERLIPIDEMIA LDL GOAL <130: ICD-10-CM

## 2020-11-10 DIAGNOSIS — Z86.19 HISTORY OF COLD SORES: ICD-10-CM

## 2020-11-10 PROCEDURE — 77067 SCR MAMMO BI INCL CAD: CPT

## 2020-11-10 PROCEDURE — 99214 OFFICE O/P EST MOD 30 MIN: CPT | Mod: 95 | Performed by: INTERNAL MEDICINE

## 2020-11-10 RX ORDER — HYDROCHLOROTHIAZIDE 25 MG/1
TABLET ORAL
Qty: 90 TABLET | Refills: 3 | Status: SHIPPED | OUTPATIENT
Start: 2020-11-10 | End: 2021-01-16

## 2020-11-10 RX ORDER — VALACYCLOVIR HYDROCHLORIDE 1 G/1
2000 TABLET, FILM COATED ORAL 2 TIMES DAILY
Qty: 30 TABLET | Refills: 2 | Status: SHIPPED | OUTPATIENT
Start: 2020-11-10 | End: 2022-07-22

## 2020-11-10 NOTE — PROGRESS NOTES
"0Jamona Davila is a 50 year old female who is being evaluated via a billable video visit.      The patient has been notified of following:     \"This video visit will be conducted via a call between you and your physician/provider. We have found that certain health care needs can be provided without the need for an in-person physical exam.  This service lets us provide the care you need with a video conversation.  If a prescription is necessary we can send it directly to your pharmacy.  If lab work is needed we can place an order for that and you can then stop by our lab to have the test done at a later time.    Video visits are billed at different rates depending on your insurance coverage.  Please reach out to your insurance provider with any questions.    If during the course of the call the physician/provider feels a video visit is not appropriate, you will not be charged for this service.\"    Patient has given verbal consent for Video visit? Yes  How would you like to obtain your AVS? [employer]  If you are dropped from the video visit, the video invite should be resent to: Via [employer]  Will anyone else be joining your video visit? No      Subjective     Rakel Davila is a 50 year old female who presents today via video visit for the following health issues:    HPI     Chief Complaint   Patient presents with     Medication Follow-up     for Kidney Stones     Refill Request     Valtrex       How many servings of fruits and vegetables do you eat daily?  2-3    On average, how many sweetened beverages do you drink each day (Examples: soda, juice, sweet tea, etc.  Do NOT count diet or artificially sweetened beverages)?   0-1    How many days per week do you exercise enough to make your heart beat faster? 3 or less    How many minutes a day do you exercise enough to make your heart beat faster? 9 or less    How many days per week do you miss taking your medication? 0           Video Start Time:  2::36 " pm    Pt's past medical history, family history, habits, medications and allergies were reviewed with the patient today.  Most recent lab results reviewed with pt. Problem list and histories reviewed & adjusted, as indicated.  Additional history as below:    HPI:      Component      Latest Ref Rng & Units 9/23/2019 11/5/2020   Sodium      133 - 144 mmol/L 138 138   Potassium      3.4 - 5.3 mmol/L 3.7 3.6   Chloride      94 - 109 mmol/L 106 106   Carbon Dioxide      20 - 32 mmol/L 30 28   Anion Gap      3 - 14 mmol/L 2 (L) 4   Glucose      70 - 99 mg/dL 97 111 (H)   Urea Nitrogen      7 - 30 mg/dL 13 12   Creatinine      0.52 - 1.04 mg/dL 0.80 0.81   GFR Estimate      >60 mL/min/1.73:m2 86 84   GFR Estimate If Black      >60 mL/min/1.73:m2 >90 >90   Calcium      8.5 - 10.1 mg/dL 9.1 9.2   Bilirubin Total      0.2 - 1.3 mg/dL 0.3    Albumin      3.4 - 5.0 g/dL 3.6    Protein Total      6.8 - 8.8 g/dL 7.3    Alkaline Phosphatase      40 - 150 U/L 79    ALT      0 - 50 U/L 22    AST      0 - 45 U/L 11    Cholesterol      <200 mg/dL 166 202 (H)   Triglycerides      <150 mg/dL 96 110   HDL Cholesterol      >49 mg/dL 35 (L) 37 (L)   LDL Cholesterol Calculated      <100 mg/dL 112 (H) 143 (H)   Non HDL Cholesterol      <130 mg/dL 131 (H) 165 (H)   TSH      0.40 - 4.00 mU/L 2.26           Had been walking  earlier this past  Spring outside. Less walking recently  Just started using treadmill  Few days ago  Denies chest pain, shortness of breath, abdominal pain,  vision changes or side effects with medications.  Denies any hematuria or acute back pain.  On hydrochlorothiazide for kidney stone prevention.  Followed by [facility].  Patient reports abnormal Pap in 2019 in 2020.  Underwent a colposcopy.  Was noted on ultrasound to have a uterine polyp.  Biopsies were negative.  Had an IUD placed to stop some vaginal bleeding.  Rare left-sided headache.  Will sometimes get some nausea with that.  Can last up to 2 days and  responds fairly well to Advil.  Happening once a month about the time of her menses.  Patient still having menstrual periods but lighter than previous.  Gets cold sores about twice a year.  Requests refill of Valtrex which patient states response well to symptoms when they occur         Additional ROS:   Constitutional, HEENT, Cardiovascular, Pulmonary, GI and , Neuro, MSK and Psych review of systems/symptoms are otherwise negative or unchanged from previous, except as noted above.           Objective    No vitals obtained during video visit    Physical Exam   GENERAL: Healthy, alert and no distress  EYES: Eyes grossly normal to inspection, conjunctivae and sclerae normal  RESP: no audible wheeze, cough, or visible cyanosis.  No visible retractions or increased work of breathing.  Able to speak fully in complete sentences.  NEURO: Cranial nerves grossly intact, mentation intact and speech normal  PSYCH: mentation appears normal, affect normal/bright, judgement and insight intact, normal speech and appearance well-groomed       ASSESSMENT:  1. History of cold sores  Symptoms approximately twice a year.  Valacyclovir refilled to use.  - valACYclovir (VALTREX) 1000 mg tablet; Take 2 tablets (2,000 mg) by mouth 2 times daily for 1 day for cold sore outbreaks  Dispense: 30 tablet; Refill: 2    2. Hyperlipidemia LDL goal <130  10-year CAD risk not to the point of requiring statin therapy.  Counseled regarding diet and exercise.  Repeat lab 1 year  - Lipid panel reflex to direct LDL Fasting; Future    3. Kidney stone  Asymptomatic.  Continue current medication.  Repeat lab 1 year  - hydrochlorothiazide (HYDRODIURIL) 25 MG tablet; TAKE 1 TABLET BY MOUTH DAILY  Dispense: 90 tablet; Refill: 3  - Basic metabolic panel; Future    4. Elevated glucose  Counseled regarding need for carbohydrate reduction.  Walking exercise as able.  Recheck lab in 3 months.  If blood sugar remains elevated, will add Metformin  - Hemoglobin A1c;  Future  - Glucose; Future    5. Hx of abnormal cervical Pap smear  Managed by gynecology.  Obtain records from [facility]      PLAN:  Continue current meds  Prescriptions refilled.    Call  [phone] or use [employer] to schedule a future lab appointment  fasting in 3 months.   For fasting labs, please refrain from eating for 8 hours or more.   Drink 2 glasses of water before your lab appointment. It is fine to take your  oral medications on the morning of the lab test as usual  Reduce saturated fats (red meats, fried and processed foods) in your diet and increase the amount of color on your plate with fruits and vegetables.  Reduce carbohydrate (sugars, starchy foods such as bread, rice, potato, pasta, etc) intake  in your diet and increase the amount of color on your plate with fruits, vegetables and lean meats.  Increase frequency of walking or other exercise   Will obtain records from [facility]          Video-Visit Details    Type of service:  Video Visit    Video End Time:2:53pm    Originating Location (pt. Location): Home    Distant Location (provider location):  [facility]     Platform used for Video Visit: Arthur Shanks MD  Internal Medicine Department  [facility]        (Chart documentation was completed, in part, with [employer] voice-recognition software. Even though reviewed, some grammatical, spelling, and word errors may remain.)

## 2020-11-14 PROBLEM — Z87.42 HX OF ABNORMAL CERVICAL PAP SMEAR: Status: ACTIVE | Noted: 2020-11-14

## 2020-11-14 PROBLEM — R73.09 ELEVATED GLUCOSE: Status: ACTIVE | Noted: 2020-11-14

## 2020-11-15 NOTE — PATIENT INSTRUCTIONS
Continue current meds  Prescriptions refilled.    Call  [phone] or use [employer] to schedule a future lab appointment  fasting in 3 months.   For fasting labs, please refrain from eating for 8 hours or more.   Drink 2 glasses of water before your lab appointment. It is fine to take your  oral medications on the morning of the lab test as usual  Reduce saturated fats (red meats, fried and processed foods) in your diet and increase the amount of color on your plate with fruits and vegetables.  Reduce carbohydrate (sugars, starchy foods such as bread, rice, potato, pasta, etc) intake  in your diet and increase the amount of color on your plate with fruits, vegetables and lean meats.  Increase frequency of walking or other exercise   Will obtain records from [facility]

## 2021-01-15 ENCOUNTER — HEALTH MAINTENANCE LETTER (OUTPATIENT)
Age: 51
End: 2021-01-15

## 2021-01-15 DIAGNOSIS — N20.0 KIDNEY STONE: ICD-10-CM

## 2021-01-16 RX ORDER — HYDROCHLOROTHIAZIDE 25 MG/1
TABLET ORAL
Qty: 90 TABLET | Refills: 3 | Status: SHIPPED | OUTPATIENT
Start: 2021-01-16 | End: 2022-01-26

## 2021-09-04 ENCOUNTER — HEALTH MAINTENANCE LETTER (OUTPATIENT)
Age: 51
End: 2021-09-04

## 2021-12-06 ENCOUNTER — ANCILLARY PROCEDURE (OUTPATIENT)
Dept: MAMMOGRAPHY | Facility: CLINIC | Age: 51
End: 2021-12-06
Attending: INTERNAL MEDICINE
Payer: COMMERCIAL

## 2021-12-06 DIAGNOSIS — Z12.31 VISIT FOR SCREENING MAMMOGRAM: ICD-10-CM

## 2021-12-06 PROCEDURE — 77063 BREAST TOMOSYNTHESIS BI: CPT | Mod: TC | Performed by: RADIOLOGY

## 2021-12-06 PROCEDURE — 77067 SCR MAMMO BI INCL CAD: CPT | Mod: TC | Performed by: RADIOLOGY

## 2022-01-23 DIAGNOSIS — N20.0 KIDNEY STONE: ICD-10-CM

## 2022-01-24 DIAGNOSIS — N20.0 KIDNEY STONE: ICD-10-CM

## 2022-01-26 RX ORDER — HYDROCHLOROTHIAZIDE 25 MG/1
TABLET ORAL
Qty: 90 TABLET | Refills: 0 | Status: SHIPPED | OUTPATIENT
Start: 2022-01-26 | End: 2022-03-28

## 2022-01-26 RX ORDER — HYDROCHLOROTHIAZIDE 25 MG/1
TABLET ORAL
Qty: 90 TABLET | Refills: 3 | OUTPATIENT
Start: 2022-01-26

## 2022-01-26 NOTE — TELEPHONE ENCOUNTER
Routing refill request to provider for review/approval because:  Labs not current:    BP Readings from Last 3 Encounters:   09/23/19 120/80   08/09/18 108/72   06/29/17 104/72     Creatinine   Date Value Ref Range Status   11/05/2020 0.81 0.52 - 1.04 mg/dL Final     Potassium   Date Value Ref Range Status   11/05/2020 3.6 3.4 - 5.3 mmol/L Final     Patient needs to be seen because it has been more than 1 year since last office visit.  Gissell DOMINGUEZ RN, BSN

## 2022-01-26 NOTE — TELEPHONE ENCOUNTER
Over a year since patient was last seen in clinic.  Medication filled for 90 days.  Call patient and schedule a follow-up appointment with me next available regular appointment slot

## 2022-02-19 ENCOUNTER — HEALTH MAINTENANCE LETTER (OUTPATIENT)
Age: 52
End: 2022-02-19

## 2022-03-28 ENCOUNTER — OFFICE VISIT (OUTPATIENT)
Dept: INTERNAL MEDICINE | Facility: CLINIC | Age: 52
End: 2022-03-28
Payer: COMMERCIAL

## 2022-03-28 VITALS
OXYGEN SATURATION: 95 % | BODY MASS INDEX: 28.45 KG/M2 | SYSTOLIC BLOOD PRESSURE: 118 MMHG | DIASTOLIC BLOOD PRESSURE: 75 MMHG | HEIGHT: 66 IN | TEMPERATURE: 98.5 F | HEART RATE: 89 BPM | WEIGHT: 177 LBS

## 2022-03-28 DIAGNOSIS — Z11.59 NEED FOR HEPATITIS C SCREENING TEST: ICD-10-CM

## 2022-03-28 DIAGNOSIS — M25.511 CHRONIC RIGHT SHOULDER PAIN: ICD-10-CM

## 2022-03-28 DIAGNOSIS — Z23 NEED FOR PROPHYLACTIC VACCINATION WITH TETANUS-DIPHTHERIA (TD): ICD-10-CM

## 2022-03-28 DIAGNOSIS — Z87.442 PERSONAL HISTORY OF KIDNEY STONES: ICD-10-CM

## 2022-03-28 DIAGNOSIS — Z13.6 CARDIOVASCULAR SCREENING; LDL GOAL LESS THAN 100: ICD-10-CM

## 2022-03-28 DIAGNOSIS — G89.29 CHRONIC RIGHT SHOULDER PAIN: ICD-10-CM

## 2022-03-28 DIAGNOSIS — Z13.1 SCREENING FOR DIABETES MELLITUS: ICD-10-CM

## 2022-03-28 LAB
ANION GAP SERPL CALCULATED.3IONS-SCNC: 6 MMOL/L (ref 3–14)
BUN SERPL-MCNC: 9 MG/DL (ref 7–30)
CALCIUM SERPL-MCNC: 9.3 MG/DL (ref 8.5–10.1)
CHLORIDE BLD-SCNC: 105 MMOL/L (ref 94–109)
CHOLEST SERPL-MCNC: 191 MG/DL
CO2 SERPL-SCNC: 26 MMOL/L (ref 20–32)
CREAT SERPL-MCNC: 0.74 MG/DL (ref 0.52–1.04)
FASTING STATUS PATIENT QL REPORTED: YES
GFR SERPL CREATININE-BSD FRML MDRD: >90 ML/MIN/1.73M2
GLUCOSE BLD-MCNC: 101 MG/DL (ref 70–99)
HDLC SERPL-MCNC: 37 MG/DL
LDLC SERPL CALC-MCNC: 133 MG/DL
NONHDLC SERPL-MCNC: 154 MG/DL
POTASSIUM BLD-SCNC: 3.7 MMOL/L (ref 3.4–5.3)
SODIUM SERPL-SCNC: 137 MMOL/L (ref 133–144)
TRIGL SERPL-MCNC: 105 MG/DL

## 2022-03-28 PROCEDURE — 87902 NFCT AGT GNTYP ALYS HEP C: CPT | Performed by: INTERNAL MEDICINE

## 2022-03-28 PROCEDURE — 36415 COLL VENOUS BLD VENIPUNCTURE: CPT | Performed by: INTERNAL MEDICINE

## 2022-03-28 PROCEDURE — 80048 BASIC METABOLIC PNL TOTAL CA: CPT | Performed by: INTERNAL MEDICINE

## 2022-03-28 PROCEDURE — 80061 LIPID PANEL: CPT | Performed by: INTERNAL MEDICINE

## 2022-03-28 PROCEDURE — 86803 HEPATITIS C AB TEST: CPT | Performed by: INTERNAL MEDICINE

## 2022-03-28 PROCEDURE — 90471 IMMUNIZATION ADMIN: CPT | Performed by: INTERNAL MEDICINE

## 2022-03-28 PROCEDURE — 90714 TD VACC NO PRESV 7 YRS+ IM: CPT | Performed by: INTERNAL MEDICINE

## 2022-03-28 PROCEDURE — 99213 OFFICE O/P EST LOW 20 MIN: CPT | Mod: 25 | Performed by: INTERNAL MEDICINE

## 2022-03-28 RX ORDER — HYDROCHLOROTHIAZIDE 25 MG/1
25 TABLET ORAL DAILY
Qty: 90 TABLET | Refills: 3 | Status: SHIPPED | OUTPATIENT
Start: 2022-03-28 | End: 2023-05-11

## 2022-03-28 NOTE — PATIENT INSTRUCTIONS
Labs as ordered   Continue diet/exercise   Referral to [facility]. They will call to schedule   Td (tetanus) vaccine booster today   Future Shingrix vaccination series. Get through pharmacy. 2 shot series 2-6 mos apart

## 2022-03-28 NOTE — PROGRESS NOTES
ASSESSMENT:   1. Chronic right shoulder pain  No previous trauma besides acute pulling of the right upper extremity with a dog leash nearly a year ago.  Possible rotator cuff tear versus chronic bursitis versus other.  Given length of symptoms, will first have patient see Ortho before considering physical therapy.  Defer decision regarding MRI imaging to them as made first proceed with shoulder injection and/or physical therapy before needing further imaging study  - Orthopedic  Referral; Future    2. Personal history of kidney stones  Patient remains asymptomatic with current medication.  Continue hydrochlorothiazide.  Labs as ordered  - Basic metabolic panel; Future  - hydrochlorothiazide (HYDRODIURIL) 25 MG tablet; Take 1 tablet (25 mg) by mouth daily  Dispense: 90 tablet; Refill: 3  - Basic metabolic panel    3. CARDIOVASCULAR SCREENING; LDL GOAL LESS THAN 100  - Lipid panel reflex to direct LDL Fasting; Future  - Lipid panel reflex to direct LDL Fasting    4. Screening for diabetes mellitus  - Basic metabolic panel; Future  - Basic metabolic panel    5. Need for hepatitis C screening test  Candidate for hepatitis C screening based on age  - Hepatitis C Screen Reflex to HCV RNA Quant and Genotype; Future  - Hepatitis C Screen Reflex to HCV RNA Quant and Genotype    6. Need for prophylactic vaccination with tetanus-diphtheria (Td)  Due for tetanus booster  - TD PRESERV FREE, IM (7+ YRS) (DECAVAC/TENIVAC)      PLAN:   Labs as ordered   Continue diet/exercise   Referral to  Ortho. They will call to schedule   Td (tetanus) vaccine booster today   Future Shingrix vaccination series. Get through pharmacy. 2 shot series 2-6 mos apart      (Chart documentation was completed, in part, with [employer] voice-recognition software. Even though reviewed, some grammatical, spelling, and word errors may remain.)    Jann Shanks MD  Internal Medicine Department  [facility]  "EULALIA Quispe is a 51 year old who presents for the following health issues     History of Present Illness       Reason for visit:  Check-up    She eats 4 or more servings of fruits and vegetables daily.She consumes 0 sweetened beverage(s) daily.She exercises with enough effort to increase her heart rate 10 to 19 minutes per day.  She exercises with enough effort to increase her heart rate 5 days per week.   She is taking medications regularly.       Concern - right shoulder pain  Onset: chronic x1 yr-  Description: thinks may have had jerked when walking the dog  Intensity: moderate  Progression of Symptoms:  same and intermittent  Accompanying Signs & Symptoms: interrupted sleep, decreased ROM  Previous history of similar problem: chronic  Precipitating factors:        Worsened by: sleeping position  Alleviating factors:        Improved by: resting  Therapies tried and outcome: OTC pain rub     Most recent lab results reviewed with pt.       Started initially with dog pulling on leash   occ worsened with sleep and lying on right side   No weakness  Worse with reaching behind her  On Weight watchers. Down 18 pounds  at home and 10 pounds on clinic scale.   On hydrochlorathiazide for kidney stone prevention. No recurrent sx  Denies chest pain, shortness of breath, abdominal pain, headache, vision changes or side effects with medications.    Additional ROS:   Constitutional, HEENT, Cardiovascular, Pulmonary, GI and , Neuro, MSK and Psych review of systems/symptoms are otherwise negative or unchanged from previous, except as noted above.      OBJECTIVE:  /75   Pulse 89   Temp 98.5  F (36.9  C)   Ht 1.664 m (5' 5.5\")   Wt 80.3 kg (177 lb)   SpO2 95%   BMI 29.01 kg/m     Estimated body mass index is 29.01 kg/m  as calculated from the following:    Height as of this encounter: 1.664 m (5' 5.5\").    Weight as of this encounter: 80.3 kg (177 lb).     Neck: no adenopathy. Thyroid normal to " palpation. No bruits  Pulm: Lungs clear to auscultation   CV: Regular rates and rhythm  GI: Soft, nontender, Normal active bowel sounds, No hepatosplenomegaly or masses palpable  Ext: Peripheral pulses intact. No edema.  Neuro: Normal strength and tone, sensory exam grossly normal  MSK: Tenderness to abduction right shoulder beyond 70 degrees.  No tenderness to internal/external rotation of the shoulder joint.  No AC joint tenderness.  Abduction strength intact

## 2022-03-30 LAB — HCV AB SERPL QL IA: REACTIVE

## 2022-03-31 LAB — HCV RNA SERPL NAA+PROBE-ACNC: NOT DETECTED IU/ML

## 2022-04-04 NOTE — PROGRESS NOTES
ASSESSMENT & PLAN  Patient Instructions     1. Biceps tendinitis of right upper extremity    2. Chronic right shoulder pain      -Patient has chronic right shoulder pain due to biceps tendinitis  -Patient will start formal physical therapy and home exercise program  -Patient will start Voltaren gel to be applied to the right shoulder twice a day consistently for the next few weeks and then on an as-needed basis as pain improves.  Patient may take 1 to 2 tablets of extra Tylenol as needed for breakthrough pain.  If patient continues to have pain, she may take Advil sparingly.  -Patient will follow up if pain does not improve.  To consider possible cortisone injection if indicated versus advanced imaging.  -Call direct clinic number [[phone]] at any time with questions or concerns.    Albert Yeo MD [facility]  [facility]          -----    SUBJECTIVE  Rakel Davila is a/an 51 year old Right handed female who is seen in consultation at the request of  Jann Shanks M.D. for evaluation of right shoulder pain. The patient is seen by themselves.  Patient complains of right shoulder pain and discomfort mostly at night, wakes her up at night. States it started when she was walking her dog and he yanked the leash and pulled her arm.   Notes her father has RA     Onset: 1 years(s) ago. Patient describes injury as walking dog, dog pulled on leash and pulled arm/shoulder  Location of Pain: right anterior shoulder, non radiating   Rating of Pain at worst: 6/10  Rating of Pain Currently: 1/10  Worsened by: reaching behind her, lateral flexion   Better with: Advil  Treatments tried: Advil, stretching, Arnica gel   Associated symptoms: notes some weakness (due to lack of use), denies numbness or tingling, some loss ROM   Orthopedic history: NO  Relevant surgical history: NO  Social history: social history: works as community relations     Past Medical History:    Diagnosis Date     Allergic rhinitis, cause unspecified     shots as a child     Blood glucose elevated 11/16/2014     BPPV (benign paroxysmal positional vertigo)      left inner ear. HAs been through PT     Closed fracture of unspecified part of vertebral column without mention of spinal cord injury     lumbar with trauma     History of cold sores      Hyperlipidemia LDL goal <130 6/30/2017     Kidney stone 2009    [facility] Nephrology (Dr Bond) and [facility]     Other fetal abnormality causing disproportion, antepartum 8/6/99    IUFD at 41.5 weeks     Vitamin D deficiency 11/16/2014     Social History     Socioeconomic History     Marital status:      Spouse name: Willie     Number of children: 2     Years of education: 16     Highest education level: Not on file   Occupational History     Occupation: Human      Employer: [employer]   Tobacco Use     Smoking status: Never Smoker     Smokeless tobacco: Never Used   Substance and Sexual Activity     Alcohol use: Yes     Comment: four per month     Drug use: No     Sexual activity: Yes     Partners: Male   Other Topics Concern      Service No     Blood Transfusions No     Caffeine Concern Yes     Occupational Exposure No     Hobby Hazards No     Sleep Concern No     Stress Concern Yes     Comment: moderate     Weight Concern Yes     Comment: is working on losing weight, has about 20 lbs yet to go     Special Diet No     Back Care No     Exercise Yes     Comment: walking     Bike Helmet Not Asked     Seat Belt Yes     Self-Exams Yes     Parent/sibling w/ CABG, MI or angioplasty before 65F 55M? Not Asked   Social History Narrative     Not on file     Social Determinants of Health     Financial Resource Strain: Not on file   Food Insecurity: Not on file   Transportation Needs: Not on file   Physical Activity: Not on file   Stress: Not on file   Social Connections: Not on file   Intimate Partner Violence:  "Not on file   Housing Stability: Not on file         Patient's past medical, surgical, social, and family histories were reviewed today and no changes are noted.    REVIEW OF SYSTEMS:  10 point ROS is negative other than symptoms noted above in HPI, Past Medical History or as stated below  Constitutional: NEGATIVE for fever, chills, change in weight  Skin: NEGATIVE for worrisome rashes, moles or lesions  GI/: NEGATIVE for bowel or bladder changes  Neuro: NEGATIVE for weakness, dizziness or paresthesias    OBJECTIVE:  /64   Ht 1.664 m (5' 5.5\")   Wt 81.4 kg (179 lb 6.4 oz)   BMI 29.40 kg/m     General: healthy, alert and in no distress  HEENT: no scleral icterus or conjunctival erythema  Skin: no suspicious lesions or rash. No jaundice.  CV: regular rhythm by palpation  Resp: normal respiratory effort without conversational dyspnea   Psych: normal mood and affect  Gait: normal steady gait with appropriate coordination and balance  Neuro: normal light touch sensory exam of the bilateral upper extremities.    MSK:  RIGHT SHOULDER  Inspection:    no swelling, bruising, discoloration, or obvious deformity or asymmetry  Palpation:    Tender about the proximal biceps tendon. Remainder of bony and tendinous landmarks are nontender.  Active Range of Motion:     Abduction normal0, FF normal0, ER normal0, IR L1.      Scapular dyskinesis absent  Strength:    Scapular plane abduction grossly intact,  ER grossly intact, IR grossly intact, biceps grossly intact, triceps grossly intact  Special Tests:    Positive: [place]'s    Negative: Neer's, Beckwith', supraspinatus (empty can), drop arm/painful arc, crossed arm adduction, Speed's and Yergason's        Independent visualization of the below image:    Personal view of right shoulder x-rays taken office today shows mild AC joint degenerative changes.  Glenohumeral joint is intact.  No acute fracture or dislocation.        Albert Yeo MD [facility]  [place] [facility]

## 2022-04-08 ENCOUNTER — ANCILLARY PROCEDURE (OUTPATIENT)
Dept: GENERAL RADIOLOGY | Facility: CLINIC | Age: 52
End: 2022-04-08
Attending: FAMILY MEDICINE
Payer: COMMERCIAL

## 2022-04-08 ENCOUNTER — OFFICE VISIT (OUTPATIENT)
Dept: ORTHOPEDICS | Facility: CLINIC | Age: 52
End: 2022-04-08
Payer: COMMERCIAL

## 2022-04-08 VITALS
WEIGHT: 179.4 LBS | DIASTOLIC BLOOD PRESSURE: 64 MMHG | BODY MASS INDEX: 28.83 KG/M2 | HEIGHT: 66 IN | SYSTOLIC BLOOD PRESSURE: 104 MMHG

## 2022-04-08 DIAGNOSIS — G89.29 CHRONIC RIGHT SHOULDER PAIN: ICD-10-CM

## 2022-04-08 DIAGNOSIS — M75.21 BICEPS TENDINITIS OF RIGHT UPPER EXTREMITY: Primary | ICD-10-CM

## 2022-04-08 DIAGNOSIS — M25.511 CHRONIC RIGHT SHOULDER PAIN: ICD-10-CM

## 2022-04-08 PROCEDURE — 99204 OFFICE O/P NEW MOD 45 MIN: CPT | Performed by: FAMILY MEDICINE

## 2022-04-08 PROCEDURE — 73030 X-RAY EXAM OF SHOULDER: CPT | Mod: RT | Performed by: RADIOLOGY

## 2022-04-08 NOTE — PATIENT INSTRUCTIONS
1. Biceps tendinitis of right upper extremity    2. Chronic right shoulder pain      -Patient has chronic right shoulder pain due to biceps tendinitis  -Patient will start formal physical therapy and home exercise program  -Patient will start Voltaren gel to be applied to the right shoulder twice a day consistently for the next few weeks and then on an as-needed basis as pain improves.  Patient may take 1 to 2 tablets of extra Tylenol as needed for breakthrough pain.  If patient continues to have pain, she may take Advil sparingly.  -Patient will follow up if pain does not improve.  To consider possible cortisone injection if indicated versus advanced imaging.  -Call direct clinic number [[phone]] at any time with questions or concerns.    Albert Yeo MD [facility]  [facility] [place]

## 2022-04-08 NOTE — LETTER
4/8/2022         RE: Rakel Davila  [address]  [facility] [address]        Dear Colleague,    Thank you for referring your patient, Rakel Davila, to the [facility] [place]. Please see a copy of my visit note below.    ASSESSMENT & PLAN  Patient Instructions     1. Biceps tendinitis of right upper extremity    2. Chronic right shoulder pain      -Patient has chronic right shoulder pain due to biceps tendinitis  -Patient will start formal physical therapy and home exercise program  -Patient will start Voltaren gel to be applied to the right shoulder twice a day consistently for the next few weeks and then on an as-needed basis as pain improves.  Patient may take 1 to 2 tablets of extra Tylenol as needed for breakthrough pain.  If patient continues to have pain, she may take Advil sparingly.  -Patient will follow up if pain does not improve.  To consider possible cortisone injection if indicated versus advanced imaging.  -Call direct clinic number [[phone]] at any time with questions or concerns.    Albert Yeo MD [facility]  [facility]          -----    SUBJECTIVE  Rakel Davila is a/an 51 year old Right handed female who is seen in consultation at the request of  Jann Shanks M.D. for evaluation of right shoulder pain. The patient is seen by themselves.  Patient complains of right shoulder pain and discomfort mostly at night, wakes her up at night. States it started when she was walking her dog and he yanked the leash and pulled her arm.   Notes her father has RA     Onset: 1 years(s) ago. Patient describes injury as walking dog, dog pulled on leash and pulled arm/shoulder  Location of Pain: right anterior shoulder, non radiating   Rating of Pain at worst: 6/10  Rating of Pain Currently: 1/10  Worsened by: reaching behind her, lateral flexion   Better with: Advil  Treatments tried: Advil,  stretching, Arnica gel   Associated symptoms: notes some weakness (due to lack of use), denies numbness or tingling, some loss ROM   Orthopedic history: NO  Relevant surgical history: NO  Social history: social history: works as community relations     Past Medical History:   Diagnosis Date     Allergic rhinitis, cause unspecified     shots as a child     Blood glucose elevated 11/16/2014     BPPV (benign paroxysmal positional vertigo)      left inner ear. HAs been through PT     Closed fracture of unspecified part of vertebral column without mention of spinal cord injury     lumbar with trauma     History of cold sores      Hyperlipidemia LDL goal <130 6/30/2017     Kidney stone 2009    Followed by Nephrology (Dr Bond) and [facility]     Other fetal abnormality causing disproportion, antepartum 8/6/99    IUFD at 41.5 weeks     Vitamin D deficiency 11/16/2014     Social History     Socioeconomic History     Marital status:      Spouse name: Willie     Number of children: 2     Years of education: 16     Highest education level: Not on file   Occupational History     Occupation: Human      Employer: [employer]   Tobacco Use     Smoking status: Never Smoker     Smokeless tobacco: Never Used   Substance and Sexual Activity     Alcohol use: Yes     Comment: four per month     Drug use: No     Sexual activity: Yes     Partners: Male   Other Topics Concern      Service No     Blood Transfusions No     Caffeine Concern Yes     Occupational Exposure No     Hobby Hazards No     Sleep Concern No     Stress Concern Yes     Comment: moderate     Weight Concern Yes     Comment: is working on losing weight, has about 20 lbs yet to go     Special Diet No     Back Care No     Exercise Yes     Comment: walking     Bike Helmet Not Asked     Seat Belt Yes     Self-Exams Yes     Parent/sibling w/ CABG, MI or angioplasty before 65F 55M? Not Asked   Social History Narrative  "    Not on file     Social Determinants of Health     Financial Resource Strain: Not on file   Food Insecurity: Not on file   Transportation Needs: Not on file   Physical Activity: Not on file   Stress: Not on file   Social Connections: Not on file   Intimate Partner Violence: Not on file   Housing Stability: Not on file         Patient's past medical, surgical, social, and family histories were reviewed today and no changes are noted.    REVIEW OF SYSTEMS:  10 point ROS is negative other than symptoms noted above in HPI, Past Medical History or as stated below  Constitutional: NEGATIVE for fever, chills, change in weight  Skin: NEGATIVE for worrisome rashes, moles or lesions  GI/: NEGATIVE for bowel or bladder changes  Neuro: NEGATIVE for weakness, dizziness or paresthesias    OBJECTIVE:  /64   Ht 1.664 m (5' 5.5\")   Wt 81.4 kg (179 lb 6.4 oz)   BMI 29.40 kg/m     General: healthy, alert and in no distress  HEENT: no scleral icterus or conjunctival erythema  Skin: no suspicious lesions or rash. No jaundice.  CV: regular rhythm by palpation  Resp: normal respiratory effort without conversational dyspnea   Psych: normal mood and affect  Gait: normal steady gait with appropriate coordination and balance  Neuro: normal light touch sensory exam of the bilateral upper extremities.    MSK:  RIGHT SHOULDER  Inspection:    no swelling, bruising, discoloration, or obvious deformity or asymmetry  Palpation:    Tender about the proximal biceps tendon. Remainder of bony and tendinous landmarks are nontender.  Active Range of Motion:     Abduction normal0, FF normal0, ER normal0, IR L1.      Scapular dyskinesis absent  Strength:    Scapular plane abduction grossly intact,  ER grossly intact, IR grossly intact, biceps grossly intact, triceps grossly intact  Special Tests:    Positive: Coal's    Negative: Neer's, Beckwith', supraspinatus (empty can), drop arm/painful arc, crossed arm adduction, Speed's and " Eloisa's        Independent visualization of the below image:    Personal view of right shoulder x-rays taken office today shows mild AC joint degenerative changes.  Glenohumeral joint is intact.  No acute fracture or dislocation.        Albert Yeo MD [facility]        Again, thank you for allowing me to participate in the care of your patient.        Sincerely,        Albert Yeo, MD

## 2022-04-18 ENCOUNTER — THERAPY VISIT (OUTPATIENT)
Dept: PHYSICAL THERAPY | Facility: CLINIC | Age: 52
End: 2022-04-18
Attending: FAMILY MEDICINE
Payer: COMMERCIAL

## 2022-04-18 DIAGNOSIS — G89.29 CHRONIC RIGHT SHOULDER PAIN: ICD-10-CM

## 2022-04-18 DIAGNOSIS — M25.511 CHRONIC RIGHT SHOULDER PAIN: ICD-10-CM

## 2022-04-18 DIAGNOSIS — M75.21 BICEPS TENDINITIS OF RIGHT UPPER EXTREMITY: ICD-10-CM

## 2022-04-18 PROCEDURE — 97110 THERAPEUTIC EXERCISES: CPT | Mod: GP | Performed by: PHYSICAL THERAPIST

## 2022-04-18 PROCEDURE — 97112 NEUROMUSCULAR REEDUCATION: CPT | Mod: GP | Performed by: PHYSICAL THERAPIST

## 2022-04-18 PROCEDURE — 97161 PT EVAL LOW COMPLEX 20 MIN: CPT | Mod: GP | Performed by: PHYSICAL THERAPIST

## 2022-04-18 NOTE — PROGRESS NOTES
Physical Therapy Initial Evaluation  Subjective:  The history is provided by the patient. No  was used.   Patient Health History  Rakel Davila being seen for sore right shoulder.     Problem began: 4/1/2021.   Problem occurred: Arm yanked by dog on leash.  Soreness at night worsening.   Pain is reported as 2/10 on pain scale.  General health as reported by patient is good.  Pertinent medical history includes: concussions/dizziness.   Red flags:  None as reported by patient.  Medical allergies: none.   Surgeries include:  None.    Current medications:  Other. Other medications details: hydrochlorathiazide to prevent kidney stones.    Current occupation is community relations.   Primary job tasks include:  Computer work.                  Therapist Generated HPI Evaluation  Problem details: April 2021 was walking dog and her shoulder got wrenched as he tried to run. Seemed to get better with time but never resolved and then a few months ago now notes that is it waking her with pain lying on either side. Notes difficulty reaching behind back or wiping motions like cleaning the mirror.         Type of problem:  Right shoulder.    This is a chronic condition.  Condition occurred with:  Contact with object.  Where condition occurred: in the community.  Patient reports pain:  Anterior.  Pain is described as aching and is intermittent.  Pain is worse in the P.M., worse during the night and worse in the A.M..  Since onset symptoms are unchanged.  Symptoms are exacerbated by using arm behind back, using arm at shoulder level and lying on extremity  and relieved by rest.      Restrictions due to condition include:  Working in normal job without restrictions.  Barriers include:  None as reported by patient.                        Objective:  Standing Alignment:    Cervical/Thoracic:  Forward head  Shoulder/UE:  Rounded shoulders                                  Cervical/Thoracic  Evaluation    AROM:  AROM Cervical:    Flexion:            100%  Extension:       90%  Rotation:         Left: 100%     Right: 90%  Side Bend:      Left: 100%     Right:  100%                               Shoulder Evaluation:  ROM:  AROM:  normal                                  Strength:    Flexion: Left:5/5   Pain:    Right: 5-/5     Pain:     Abduction:  Left: 5/5  Pain:    Right: 5/5     Pain:    Internal Rotation:  Left:5/5     Pain:    Right: 5/5     Pain:  External Rotation:   Left:5/5     Pain:   Right:5-/5     Pain:        Elbow Flexion:  Left:5/5     Pain:    Right:5/5     Pain:  Elbow Extension:  Left:5/5     Pain:    Right:5-/5     Pain:      Palpation:      Right shoulder tenderness present at: Bicipital Groove  Mobility Tests:  normal                                                 General     ROS    Repeated c-retr: increased ext and RR with less pain. Trial 5x/day to see effect on R shoulder pain waking her in bed.    Assessment/Plan:    Patient is a 51 year old female with right side shoulder complaints.    Patient has the following significant findings with corresponding treatment plan.                Diagnosis 1:  R shoulder pain  Pain -  manual therapy, self management, education, directional preference exercise and home program  Decreased ROM/flexibility - manual therapy and therapeutic exercise  Decreased strength - therapeutic exercise and therapeutic activities  Decreased function - therapeutic activities  Impaired posture - neuro re-education    Therapy Evaluation Codes:   Cumulative Therapy Evaluation is: Low complexity.    Previous and current functional limitations:  (See Goal Flow Sheet for this information)    Short term and Long term goals: (See Goal Flow Sheet for this information)     Communication ability:  Patient appears to be able to clearly communicate and understand verbal and written communication and follow directions correctly.  Treatment Explanation - The following has  been discussed with the patient:   RX ordered/plan of care  Anticipated outcomes  Possible risks and side effects  This patient would benefit from PT intervention to resume normal activities.   Rehab potential is good.    Frequency:  1 X week, once daily  Duration:  for 6 weeks  Discharge Plan:  Achieve all LTG.  Independent in home treatment program.  Reach maximal therapeutic benefit.    Please refer to the daily flowsheet for treatment today, total treatment time and time spent performing 1:1 timed codes.

## 2022-04-25 ENCOUNTER — THERAPY VISIT (OUTPATIENT)
Dept: PHYSICAL THERAPY | Facility: CLINIC | Age: 52
End: 2022-04-25
Attending: FAMILY MEDICINE
Payer: COMMERCIAL

## 2022-04-25 DIAGNOSIS — G89.29 CHRONIC RIGHT SHOULDER PAIN: Primary | ICD-10-CM

## 2022-04-25 DIAGNOSIS — M25.511 CHRONIC RIGHT SHOULDER PAIN: Primary | ICD-10-CM

## 2022-04-25 PROCEDURE — 97112 NEUROMUSCULAR REEDUCATION: CPT | Mod: GP | Performed by: PHYSICAL THERAPIST

## 2022-04-25 PROCEDURE — 97110 THERAPEUTIC EXERCISES: CPT | Mod: GP | Performed by: PHYSICAL THERAPIST

## 2022-05-02 ENCOUNTER — THERAPY VISIT (OUTPATIENT)
Dept: PHYSICAL THERAPY | Facility: CLINIC | Age: 52
End: 2022-05-02
Attending: FAMILY MEDICINE
Payer: COMMERCIAL

## 2022-05-02 DIAGNOSIS — M25.511 CHRONIC RIGHT SHOULDER PAIN: Primary | ICD-10-CM

## 2022-05-02 DIAGNOSIS — G89.29 CHRONIC RIGHT SHOULDER PAIN: Primary | ICD-10-CM

## 2022-05-02 PROCEDURE — 97112 NEUROMUSCULAR REEDUCATION: CPT | Mod: GP | Performed by: PHYSICAL THERAPIST

## 2022-05-02 PROCEDURE — 97110 THERAPEUTIC EXERCISES: CPT | Mod: GP | Performed by: PHYSICAL THERAPIST

## 2022-05-13 ENCOUNTER — THERAPY VISIT (OUTPATIENT)
Dept: PHYSICAL THERAPY | Facility: CLINIC | Age: 52
End: 2022-05-13
Payer: COMMERCIAL

## 2022-05-13 DIAGNOSIS — G89.29 CHRONIC RIGHT SHOULDER PAIN: Primary | ICD-10-CM

## 2022-05-13 DIAGNOSIS — M25.511 CHRONIC RIGHT SHOULDER PAIN: Primary | ICD-10-CM

## 2022-05-13 PROCEDURE — 97530 THERAPEUTIC ACTIVITIES: CPT | Mod: GP | Performed by: PHYSICAL THERAPIST

## 2022-05-13 PROCEDURE — 97112 NEUROMUSCULAR REEDUCATION: CPT | Mod: GP | Performed by: PHYSICAL THERAPIST

## 2022-05-13 PROCEDURE — 97110 THERAPEUTIC EXERCISES: CPT | Mod: GP | Performed by: PHYSICAL THERAPIST

## 2022-06-01 ENCOUNTER — THERAPY VISIT (OUTPATIENT)
Dept: PHYSICAL THERAPY | Facility: CLINIC | Age: 52
End: 2022-06-01
Payer: COMMERCIAL

## 2022-06-01 DIAGNOSIS — G89.29 CHRONIC RIGHT SHOULDER PAIN: Primary | ICD-10-CM

## 2022-06-01 DIAGNOSIS — M25.511 CHRONIC RIGHT SHOULDER PAIN: Primary | ICD-10-CM

## 2022-06-01 PROCEDURE — 97110 THERAPEUTIC EXERCISES: CPT | Mod: GP | Performed by: PHYSICAL THERAPIST

## 2022-06-01 PROCEDURE — 97112 NEUROMUSCULAR REEDUCATION: CPT | Mod: GP | Performed by: PHYSICAL THERAPIST

## 2022-06-01 NOTE — PROGRESS NOTES
Subjective:  HPI  Physical Exam                    Objective:  System    Physical Exam    General     ROS    Assessment/Plan:    PROGRESS  REPORT    Progress reporting period is from 4-18-22 to 6-1-22.       SUBJECTIVE  Subjective changes noted by patient: I'm 90% improved. Initial complains of waking d/t pain, reaching behind back, and wiping motions for cleaning have resolved. Sleeping well. Notes mild soreness the day after strengthening exercises. Feels ready to continue on her own now.    Current Pain level: 0/10.     Initial Pain level: 2/10.   Changes in function:  Yes (See Goal flowsheet attached for changes in current functional level)  Adverse reaction to treatment or activity: None    OBJECTIVE  Changes noted in objective findings:  Yes,   Objective: R shoulder and CAROM full and painfree. MMT: flex=5-/5, abd=5/5, ER=5-5/, IR=5/5.     ASSESSMENT/PLAN  Updated problem list and treatment plan: Diagnosis 1:  R shoulder pain  Pain -  home program  Decreased strength - therapeutic exercise, therapeutic activities and home program  Decreased function - therapeutic activities and home program  STG/LTGs have been met or progress has been made towards goals:  Yes (See Goal flow sheet completed today.)  Assessment of Progress: The patient's condition is improving.  The patient has met all of their long term goals.  Self Management Plans:  Patient has been instructed in a home treatment program.  Patient  has been instructed in self management of symptoms.  I have re-evaluated this patient and find that the nature, scope, duration and intensity of the therapy is appropriate for the medical condition of the patient.  Rakel continues to require the following intervention to meet STG and LTG's:  PT intervention is no longer required to meet STG/LTG.    Recommendations:  This patient is ready to be discharged from therapy and continue their home treatment program.    Please refer to the daily flowsheet for treatment  today, total treatment time and time spent performing 1:1 timed codes.

## 2022-06-16 NOTE — LETTER
Chief complaint:   No chief complaint on file.    Vitals:  There were no vitals taken for this visit.     No results found for: LDLDIR     HISTORY OF PRESENT ILLNESS     HPI         Problem List    1. Hospital Admission 5/22/2022 - Respiratory failure r/t diastolic dysfunction  2. Diastolic Heart Failure: LVEF 67%  3. Cardiomyopathy  4. Valvular Heart Disease  5. Paroxysmal Atrial Fibrillation - CHADSVASc Stroke Risk Score = 6  6. 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 Hypertension  8. Hyperlipidemia  9. Essential tremor       Yuki King a 73 year old  female patient, born 1949, Medical record Number: [id number], was seen in the [facility] at 4:21 PM,on 6/16/2022 for follow up cardiology visit.    Yuki King was evaluated 02/05/2019 for consult following ED visit 12/18/2018 (review below). At visit, she had reported occasional Shortness of Breath, but no other complaints. She was advised to obtain a 48 Hour Holter demonstrating predominant Sinus Rhythm with 4-5 beat runs of Atrial Tachycardia - no Atrial Fibrillation noted. Xarelto was exchanged for Aspirin 325 mg daily.  Patient again admitted 4/3/2019 for shortness of breath.  EKG demonstrated ST depression, stress test was negative.  Patient fluid overloaded, diuresed inpatient and sent home on oral diuretics.     Dr. Abdi was consulted 08/19/2019 for witch patient was restarted on anticoagulation due to recurrent epistaxis with Eliquis and Xarelto, and was further advised to pursue high risk medical therapy with either Tikosyn or Sotalol with Cardioversion.  Patient seeing ENT with epitaxis with anticoagulation.    Visit 1/19/2022 patient reported increased shortness of breath and edema.  Metolazone was added to regimen.  Repeat echocardiogram and stress test ordered.  Stress test performed 2/9/2022 showed no evidence of ischemia.    Visit 3/3/2022 patient reported PCP took her off the water pills due to decline in kidney function.   [facility]  [address] [address]  [phone]            Rakel Davila  [address]  [facility] [address]        August 7, 2019    Dear Brittaney,    While refilling your prescription today, we noticed that you are due for an appointment with your provider.  We will refill your prescription for 30 days, but a follow-up appointment must be made before any additional refills can be approved.     Taking care of your health is important to us and we look forward to seeing you in the near future.  Please call us at [phone] or [phone] (or use [facility]) to schedule an appointment.     Please disregard this notice if you have already made an appointment.    Sincerely,        [facility]   Patient still reported losing weight and no edema.  Patient was referred to nephrology.    At last visit, 3/23/2022, patient reports dyspnea.  Has a 40 year smoking history with quit date in 2008.  Denied any further cardiac concerns.  No changes to medication regimen or additional testing ordered at this time.     Since last visit, patient unfortunately was in the ED 5/22/2022 for hypoxic respiratory failure due to acute CHF exacerbation new sclerotic bone lesions noted in thoracic spine on CT (hx breast cancer).  She presented with 1 week of leg swelling, fatigue, and shortness of breath.  Patient skipped furosemide due to going on bus ride and did not want to have to berna the bathroom.  Patient's SPO2 mid 80's upon arrival, received several doses of IV furosemide. Patient discharged home to follow up with PCP.     Today,***      Other significant problems:  Patient Active Problem List   Diagnosis   • Breast cancer (CMS/HCC)   • High cholesterol   • Benign essential HTN   • Benign essential tremor   • Cerebrovascular accident (CVA) (CMS/HCC)   • Paroxysmal atrial fibrillation (CMS/HCC)   • Pulmonary edema   • Left atrial enlargement   • LVEF 70%, 4/2019   • Long term (current) use of anticoagulants   • Encounter for therapeutic drug monitoring   • Cerebrovascular accident (CVA) due to embolism of right anterior cerebral artery (CMS/HCC)   • Diastolic dysfunction   • Acute diastolic CHF (congestive heart failure) (CMS/HCC)   • Hyponatremia   • High risk medication use   • Cerebrovascular accident (CVA) due to thrombosis of basilar artery (CMS/HCC)   • Stage 3a chronic kidney disease (CMS/HCC)   • Abnormal electrocardiogram (ECG) (EKG)   • Alcohol abuse   • Lytic lesion of bone on x-ray   • Pulmonary nodule     PAST MEDICAL, FAMILY AND SOCIAL HISTORY     Medications:  Current Outpatient Medications   Medication Sig Dispense Refill   • atorvastatin (LIPITOR) 80 MG tablet Take 1 tablet by mouth daily. 90 tablet 1   •  metoPROLOL succinate (TOPROL-XL) 50 MG 24 hr tablet Take 2 tablets by mouth every morning AND 1 tablet every evening. 270 tablet 0   • pantoprazole (PROTONIX) 20 MG tablet Take 1 tablet by mouth daily. 90 tablet 3   • fluticasone (Flonase Sensimist) 27.5 MCG/SPRAY nasal spray Spray 2 sprays in each nostril daily. 10 g 12   • furosemide (LASIX) 20 MG tablet 1 tab by mouth daily 90 tablet 3   • potassium CHLORIDE (KLOR-CON M) 20 MEQ cuate ER tablet Take 1 tablet by mouth daily. 30 tablet 1   • magnesium oxide (MAG-OX) 400 (240 Mg) MG tablet Take 1 tablet by mouth daily. 90 tablet 1   • warfarin (COUMADIN) 5 MG tablet Take 1/2 tablet 4 days per week and 1 tablet 3 days per week on Mondays, Wednesdays and Fridays or as directed. 110 tablet 3   • escitalopram (LEXAPRO) 20 MG tablet Take 1 tablet by mouth daily. 90 tablet 0   • dilTIAZem (CARDIZEM CD) 360 MG 24 hr capsule Take 1 capsule by mouth daily. 90 capsule 3   • MELATONIN PO Take 5 mg by mouth nightly as needed (sleep).     • clobetasol (TEMOVATE) 0.05 % ointment APPLY TOPICALLY TO THE AFFECTED AREA ON THE BODY TWICE DAILY (Patient taking differently: APPLY TOPICALLY TO THE AFFECTED AREA ON THE BODY TWICE DAILY as needed - applies on back) 60 g 0   • zoster vaccine recomb adjuvanted (SHINGRIX) 50 MCG/0.5ML injection Inject 0.5 mLs into the muscle 1 time. Repeat dose in 2 to 6 months (unless 1 dose already given), for a total of 2 doses. 1 each 1   • sodium chloride (SM NASAL SPRAY SALINE) 0.65 % nasal spray Spray 1 spray in each nostril as needed for Congestion. 30 mL 1   • Polyethyl Glycol-Propyl Glycol (SYSTANE OP) Place 1 drop into both eyes daily as needed (Dry eyes).      • vitamin - therapeutic multivitamins w/minerals (CENTRUM SILVER,THERA-M) TABS Take 1 tablet by mouth daily.     • acetaminophen (TYLENOL) 325 MG tablet Take 325 mg by mouth every 4 hours as needed.        No current facility-administered medications for this visit.     Facility-Administered  Medications Ordered in Other Visits   Medication Dose Route Frequency Provider Last Rate Last Admin   • ceFAZolin (ANCEF) injection   Intravenous PRN Danny Ferro CRNA   1 g at 09/06/13 0734     The following histories were personally reviewed and updated.  Allergies, Problem list, Past Medical History, Past Surgical History, Social History and Family History    REVIEW OF SYSTEMS     Review of Systems   Constitutional: Negative for activity change, appetite change, fatigue and unexpected weight change.   Respiratory: Positive for shortness of breath. Negative for apnea and chest tightness.    Cardiovascular: Negative for chest pain, palpitations and leg swelling.   Musculoskeletal: Negative for myalgias.   Skin: Negative for pallor.   Neurological: Negative for dizziness, syncope, light-headedness and numbness.   All other systems reviewed and are negative.      PHYSICAL EXAM     Physical Exam  Vitals reviewed.   Constitutional:       Appearance: She is well-developed.   HENT:      Head: Normocephalic.      Neck: Neck supple.   Neck:      Thyroid: No thyromegaly.      Vascular: No JVD.      Trachea: No tracheal deviation.   Cardiovascular:      Rate and Rhythm: Normal rate and regular rhythm.      Heart sounds: Normal heart sounds, S1 normal and S2 normal. No murmur heard.   No systolic murmur is present.   No diastolic murmur is present.    No friction rub. No gallop.   Pulmonary:      Effort: Pulmonary effort is normal. No respiratory distress.      Breath sounds: Normal breath sounds. No decreased breath sounds, wheezing or rales.   Chest:      Chest wall: No tenderness.   Abdominal:      Palpations: Abdomen is soft.   Musculoskeletal:         General: Normal range of motion.   Skin:     General: Skin is warm.   Neurological:      Mental Status: She is alert and oriented to person, place, and time.       EKG 5/23/2022:      Echo 03/22/2022  Normal left ventricular systolic function with ejection fraction of  64 %.  Moderately elevated right ventricular systolic pressure 47 mmHg.  Severely enlarged left atrial chamber size.  Severely enlarged right atrial chamber size.  Moderate to severe tricuspid valve regurgitation.  Right ventricular systolic pressure; 47 mmHg.  Moderate mitral valve regurgitation.  Compared to prior study from 10/1/2019 the Tricuspid regurgitation is slightly more.    NM Stress Test 2022:  IMPRESSION:   1. Myocardial perfusion imaging showed  no area of ischemia or infarction.    2. Normal  wall motion imaging  3. Ejection fraction of 79 %   4. Normal  perfusion scan     EKG 2022:      ECG 2021      Holter 2019  Baseline rhythm is atrial fibrillation, intact conduction.  Throughout recording, the patient remains in atrial fibrillation.  The average heart rate of 95%.  No significant bradycardic events or pauses more than 3 seconds noted.  No ventricular arrhythmia noted.  Clinical correlation is recommended.  Essentially atrial fibrillation with average heart rate of 95 beats per minute.    EK2021  Machine read as accelerated junctional rhythm with premature supraventricular complexes, but personal review indicates afib.      Ekg 2019      Holter 10/04/2019  Holter monitoring period documents documented Atrial fibrillation with one 7 beat run of Non sustained VT.   No other clinically significant arrhythmias. No significant pauses. Clinical correlation is urged.    Echo 10/01/2019  Normal left ventricular cavity size. Moderately increased left ventricular wall thickness. Moderate left ventricular hypertrophy.  Normal left ventricular systolic function. Left ventricular ejection fraction, 67 %. No regional wall motion abnormalities.  Indeterminate diastolic function. Rhythm precludes evaluation of diastolic function.  Moderate mitral valve regurgitation.  Moderate tricuspid valve regurgitation.  Mild aortic valve regurgitation.  No significant change since the prior  study.    NM Stress Test 04/04/2019  IMPRESSION:  1.  No reversible changes suggestive of ischemia.  Small severity fixed defect which may be secondary to apical thinning, normal variant  2. Normal left ventricular systolic function,  3. Cardiac Risk Assessment: Low based on imaging findings.  Correlate clinically.     Echo 04/04/2019  Normal left ventricular cavity size. Mildly increased left ventricular wall thickness. Mild left ventricular hypertrophy. Normal left ventricular systolic function. Left ventricular ejection fraction, 70 %. No regional wall motion abnormalities. Rhythm precludes evaluation of diastolic function.    ASSESSMENT/PLAN     Recent Labs   Lab 06/02/22  1152 05/24/22  0519 05/23/22  0539 05/22/22  1845 05/20/22  1341 05/06/22  1309 03/22/22  0000 03/14/22  1716 02/24/22  1152 02/24/22  1151 12/15/21  0000 12/07/21  1438 11/30/21  0851   HGB 12.1 11.1* 11.2* 13.0  --   --   --  11.7*   < >  --    < > 11.8*  --    BUN 13 16 14 15  --   --   --  13   < > 49*   < >  --  8   Creatinine 1.08* 1.02* 0.99* 1.07*  --   --   --  1.13*   < > 1.94*   < >  --  1.00*   Potassium 4.3 3.4 3.6 3.6  --   --   --  5.2*   < > 2.1*   < >  --  4.0   Cholesterol  --   --   --   --   --   --   --   --   --   --   --   --  129   HDL  --   --   --   --   --   --   --   --   --   --   --   --  78   Cholesterol/ HDL Ratio  --   --   --   --   --   --   --   --   --   --   --   --  1.7   Triglycerides  --   --   --   --   --   --   --   --   --   --   --   --  65   CALCLDL  --   --   --   --   --   --   --   --   --   --   --   --  38   GOT/AST  --  17 23 21  --   --   --  22  --  31  --   --   --    GPT  --  24 24 28  --   --   --  41  --  42  --   --   --    INR  --  1.8 2.0 2.0 2.3 2.3   < >  --    < >  --    < >  --   --    TSH  --   --   --   --   --   --   --   --   --   --   --  2.875  --     < > = values in this interval not displayed.     Ejection fraction[] :   67%. [] Not Done  Last Stress test:   Performed:[x].  Positive: []. Negative:[x]   Documented BP ( 116/80    mmHg) controlled as per GDMT: [x]Yes  []No   Creatinine: []Yes / [] No  [unfilled],  LDL: @LASTLABX(calcldl:1) @ is within guidelines []Yes / [] No  PAD screening VIVIEN study done: []Yes / []No   Carotid doppler study performed: []Yes ::  []No     ASSESSMENT AND PLAN:    Yuki King presents with the following active problems:    PROBLEMS:    1. Hospital Admission 5/22/2022 - Respiratory failure r/t diastolic dysfunction  2. Diastolic Heart Failure: LVEF 67%  3. Cardiomyopathy  4. Valvular Heart Disease  5. Paroxysmal Atrial Fibrillation - CHADSVASc Stroke Risk Score = 6  6. 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 Hypertension  8. Hyperlipidemia  9. Essential tremor     RECOMMENDATIONS:    Diastolic Heart Failure/Cardiomyopathy/Valvular Heart Disease  Component      Latest Ref Rng & Units 9/21/2019 9/25/2019 10/1/2019   NT proBNP      <126 pg/mL 4,810 (H) 5,150 (H) 3,100 (H)   Echo 10/01/2019 demonstrated LVEF of 67% with moderate LVH with moderate mitral and tricuspid valve regurgitation  Advise she have a RHC/LHC for further evaluation at office visit 10/2019, this was postponed due to admission for Sotalol therapy  Furosemide 20 MG daily, Metolazone 2.5 MG daily  NM Stress Test 2/9/2022: No evidence of ischemia  Echo 03/22/2022: Normal left ventricular systolic function with ejection fraction of 64 %.  Moderately elevated right ventricular systolic pressure 47 mmHg.  Severely enlarged biatrial chamber size.  Moderate to severe tricuspid valve regurgitation.  Right ventricular systolic pressure; 47 mmHg.  Moderate mitral valve regurgitation.  Compared to prior study from 10/1/2019 the Tricuspid regurgitation is slightly more.  ED 5/22/2022: Increased leg swelling and shortness of breath, skipped furosemide dose due to bus trip, patient given several doses of IV lasix and discharged in stable condition  ***    Paroxysmal Atrial Fibrillation  CHADSVASc  Stroke Risk Score = 6  Embolic CVA 12/18/2018  Currently anticoagulated with warfarin - patient to call insurance company to switch to Eliquis due to increased bruising.  Patient had increased epistaxis with Xarelto   Failed therapy with Sotalol, remains on Toprol  mg morning, 50 mg evening  A-fib managed by Dr. Abdi    Hypertension  There were no vitals taken for this visit.   ***    Hyperlipidemia  LDL (mg/dL)   Date Value   11/30/2021 38   Remains on Atorvastatin 80 mg daily  ***        FOLLOW UP: Yuki King is expected to follow up in *** months.    ***    ***    Summary:  Yuki King is a 73 year old female presenting with lower extremity, shortness of breath, hypertension, hyperlipidemia, and diastolic heart failure, moderate mitral regurgitation, moderate tricuspid regurgitation, and the above listed problems.  No other new concerning clinical signs or symptoms. Yuki King remains clinically stable. Today's exam finding  are noted. Reviewed medications, last ejection fraction,(67 %), low-density lipoprotein,(38 mg/dL) serum creatinine,(1.08 mg/dL) and other relevant tests. Advised this patient to  continue current medications.  Encouraged compliance with prescribed therapies and lifestyle changes. Yuki King's  health concerns and questions were addressed. Future recommendations and any required tests were explained, and cardiology were appointments scheduled. Suggested follow up in *** months, earlier if needed.       Gely Robison MD, FACC, FACP, FSCAI.  Interventional Cardiology  [facility]  Pager: [phone]  [email or web address]  4:21 PM, 6/16/2022  [facility]: [phone]   [facility]: [phone]  [facility]: [phone]  [email or web address]

## 2022-07-18 ENCOUNTER — TRANSFERRED RECORDS (OUTPATIENT)
Dept: HEALTH INFORMATION MANAGEMENT | Facility: CLINIC | Age: 52
End: 2022-07-18

## 2022-07-21 ENCOUNTER — MYC MEDICAL ADVICE (OUTPATIENT)
Dept: INTERNAL MEDICINE | Facility: CLINIC | Age: 52
End: 2022-07-21

## 2022-07-21 DIAGNOSIS — Z86.19 HISTORY OF COLD SORES: ICD-10-CM

## 2022-07-24 RX ORDER — VALACYCLOVIR HYDROCHLORIDE 1 G/1
2000 TABLET, FILM COATED ORAL 2 TIMES DAILY
Qty: 28 TABLET | Refills: 0 | Status: SHIPPED | OUTPATIENT
Start: 2022-07-24 | End: 2023-06-29

## 2022-10-13 ENCOUNTER — LAB REQUISITION (OUTPATIENT)
Dept: LAB | Facility: CLINIC | Age: 52
End: 2022-10-13

## 2022-10-13 DIAGNOSIS — Z01.419 ENCOUNTER FOR GYNECOLOGICAL EXAMINATION (GENERAL) (ROUTINE) WITHOUT ABNORMAL FINDINGS: ICD-10-CM

## 2022-10-13 PROCEDURE — G0124 SCREEN C/V THIN LAYER BY MD: HCPCS | Performed by: PATHOLOGY

## 2022-10-13 PROCEDURE — 87624 HPV HI-RISK TYP POOLED RSLT: CPT | Performed by: OBSTETRICS & GYNECOLOGY

## 2022-10-13 PROCEDURE — G0145 SCR C/V CYTO,THINLAYER,RESCR: HCPCS | Performed by: OBSTETRICS & GYNECOLOGY

## 2022-10-19 LAB
BKR LAB AP GYN ADEQUACY: ABNORMAL
BKR LAB AP GYN INTERPRETATION: ABNORMAL
BKR LAB AP HPV REFLEX: ABNORMAL
BKR LAB AP LMP: ABNORMAL
BKR LAB AP PREVIOUS ABNL DX: ABNORMAL
BKR LAB AP PREVIOUS ABNORMAL: ABNORMAL
PATH REPORT.COMMENTS IMP SPEC: ABNORMAL
PATH REPORT.COMMENTS IMP SPEC: ABNORMAL
PATH REPORT.RELEVANT HX SPEC: ABNORMAL

## 2022-10-21 LAB
HUMAN PAPILLOMA VIRUS 16 DNA: NEGATIVE
HUMAN PAPILLOMA VIRUS 18 DNA: NEGATIVE
HUMAN PAPILLOMA VIRUS FINAL DIAGNOSIS: NORMAL
HUMAN PAPILLOMA VIRUS OTHER HR: NEGATIVE

## 2022-10-22 ENCOUNTER — HEALTH MAINTENANCE LETTER (OUTPATIENT)
Age: 52
End: 2022-10-22

## 2022-11-11 ENCOUNTER — LAB REQUISITION (OUTPATIENT)
Dept: LAB | Facility: CLINIC | Age: 52
End: 2022-11-11

## 2022-11-11 PROCEDURE — 88305 TISSUE EXAM BY PATHOLOGIST: CPT | Performed by: PATHOLOGY

## 2022-11-15 LAB
PATH REPORT.COMMENTS IMP SPEC: NORMAL
PATH REPORT.COMMENTS IMP SPEC: NORMAL
PATH REPORT.FINAL DX SPEC: NORMAL
PATH REPORT.GROSS SPEC: NORMAL
PATH REPORT.MICROSCOPIC SPEC OTHER STN: NORMAL
PHOTO IMAGE: NORMAL

## 2022-12-28 ENCOUNTER — ANCILLARY PROCEDURE (OUTPATIENT)
Dept: MAMMOGRAPHY | Facility: CLINIC | Age: 52
End: 2022-12-28
Attending: INTERNAL MEDICINE
Payer: COMMERCIAL

## 2022-12-28 DIAGNOSIS — Z12.31 VISIT FOR SCREENING MAMMOGRAM: ICD-10-CM

## 2022-12-28 PROCEDURE — 77063 BREAST TOMOSYNTHESIS BI: CPT | Mod: TC | Performed by: RADIOLOGY

## 2022-12-28 PROCEDURE — 77067 SCR MAMMO BI INCL CAD: CPT | Mod: TC | Performed by: RADIOLOGY

## 2023-04-01 ENCOUNTER — HEALTH MAINTENANCE LETTER (OUTPATIENT)
Age: 53
End: 2023-04-01

## 2023-05-11 DIAGNOSIS — Z87.442 PERSONAL HISTORY OF KIDNEY STONES: ICD-10-CM

## 2023-05-11 RX ORDER — HYDROCHLOROTHIAZIDE 25 MG/1
TABLET ORAL
Qty: 90 TABLET | Refills: 0 | Status: SHIPPED | OUTPATIENT
Start: 2023-05-11 | End: 2023-06-29

## 2023-06-28 ASSESSMENT — ENCOUNTER SYMPTOMS
DIARRHEA: 0
HEMATOCHEZIA: 0
EYE PAIN: 0
WEAKNESS: 0
JOINT SWELLING: 0
CHILLS: 0
SORE THROAT: 0
SHORTNESS OF BREATH: 0
HEMATURIA: 0
COUGH: 0
FREQUENCY: 0
HEADACHES: 0
ARTHRALGIAS: 0
BREAST MASS: 0
CONSTIPATION: 0
PALPITATIONS: 0
PARESTHESIAS: 0
NERVOUS/ANXIOUS: 0
HEARTBURN: 0
NAUSEA: 0
FEVER: 0
DIZZINESS: 0
ABDOMINAL PAIN: 0
DYSURIA: 0
MYALGIAS: 0

## 2023-06-29 ENCOUNTER — OFFICE VISIT (OUTPATIENT)
Dept: INTERNAL MEDICINE | Facility: CLINIC | Age: 53
End: 2023-06-29
Payer: COMMERCIAL

## 2023-06-29 VITALS
HEART RATE: 74 BPM | SYSTOLIC BLOOD PRESSURE: 115 MMHG | TEMPERATURE: 98.3 F | WEIGHT: 184 LBS | DIASTOLIC BLOOD PRESSURE: 74 MMHG | OXYGEN SATURATION: 95 % | HEIGHT: 66 IN | BODY MASS INDEX: 29.57 KG/M2

## 2023-06-29 DIAGNOSIS — Z00.01 ENCOUNTER FOR ROUTINE ADULT MEDICAL EXAM WITH ABNORMAL FINDINGS: Primary | ICD-10-CM

## 2023-06-29 DIAGNOSIS — Z86.19 HISTORY OF COLD SORES: ICD-10-CM

## 2023-06-29 DIAGNOSIS — Z87.442 PERSONAL HISTORY OF KIDNEY STONES: ICD-10-CM

## 2023-06-29 DIAGNOSIS — Z12.31 ENCOUNTER FOR SCREENING MAMMOGRAM FOR BREAST CANCER: ICD-10-CM

## 2023-06-29 LAB
ALBUMIN SERPL BCG-MCNC: 4.4 G/DL (ref 3.5–5.2)
ALP SERPL-CCNC: 75 U/L (ref 35–104)
ALT SERPL W P-5'-P-CCNC: 21 U/L (ref 0–50)
ANION GAP SERPL CALCULATED.3IONS-SCNC: 10 MMOL/L (ref 7–15)
AST SERPL W P-5'-P-CCNC: 20 U/L (ref 0–45)
BILIRUB SERPL-MCNC: 0.3 MG/DL
BUN SERPL-MCNC: 11.7 MG/DL (ref 6–20)
CALCIUM SERPL-MCNC: 9.7 MG/DL (ref 8.6–10)
CHLORIDE SERPL-SCNC: 104 MMOL/L (ref 98–107)
CHOLEST SERPL-MCNC: 195 MG/DL
CREAT SERPL-MCNC: 0.82 MG/DL (ref 0.51–0.95)
DEPRECATED HCO3 PLAS-SCNC: 29 MMOL/L (ref 22–29)
ERYTHROCYTE [DISTWIDTH] IN BLOOD BY AUTOMATED COUNT: 11.8 % (ref 10–15)
GFR SERPL CREATININE-BSD FRML MDRD: 85 ML/MIN/1.73M2
GLUCOSE SERPL-MCNC: 101 MG/DL (ref 70–99)
HCT VFR BLD AUTO: 47.2 % (ref 35–47)
HDLC SERPL-MCNC: 35 MG/DL
HGB BLD-MCNC: 15.6 G/DL (ref 11.7–15.7)
LDLC SERPL CALC-MCNC: 127 MG/DL
MCH RBC QN AUTO: 30.8 PG (ref 26.5–33)
MCHC RBC AUTO-ENTMCNC: 33.1 G/DL (ref 31.5–36.5)
MCV RBC AUTO: 93 FL (ref 78–100)
NONHDLC SERPL-MCNC: 160 MG/DL
PLATELET # BLD AUTO: 321 10E3/UL (ref 150–450)
POTASSIUM SERPL-SCNC: 3.9 MMOL/L (ref 3.4–5.3)
PROT SERPL-MCNC: 7.7 G/DL (ref 6.4–8.3)
RBC # BLD AUTO: 5.07 10E6/UL (ref 3.8–5.2)
SODIUM SERPL-SCNC: 143 MMOL/L (ref 136–145)
TRIGL SERPL-MCNC: 166 MG/DL
WBC # BLD AUTO: 7.2 10E3/UL (ref 4–11)

## 2023-06-29 PROCEDURE — 99396 PREV VISIT EST AGE 40-64: CPT | Performed by: INTERNAL MEDICINE

## 2023-06-29 PROCEDURE — 80053 COMPREHEN METABOLIC PANEL: CPT | Performed by: INTERNAL MEDICINE

## 2023-06-29 PROCEDURE — 85027 COMPLETE CBC AUTOMATED: CPT | Performed by: INTERNAL MEDICINE

## 2023-06-29 PROCEDURE — 80061 LIPID PANEL: CPT | Performed by: INTERNAL MEDICINE

## 2023-06-29 PROCEDURE — 99213 OFFICE O/P EST LOW 20 MIN: CPT | Mod: 25 | Performed by: INTERNAL MEDICINE

## 2023-06-29 PROCEDURE — 36415 COLL VENOUS BLD VENIPUNCTURE: CPT | Performed by: INTERNAL MEDICINE

## 2023-06-29 RX ORDER — VALACYCLOVIR HYDROCHLORIDE 1 G/1
2000 TABLET, FILM COATED ORAL 2 TIMES DAILY
Qty: 28 TABLET | Refills: 0 | Status: SHIPPED | OUTPATIENT
Start: 2023-06-29

## 2023-06-29 RX ORDER — HYDROCHLOROTHIAZIDE 25 MG/1
25 TABLET ORAL DAILY
Qty: 90 TABLET | Refills: 3 | Status: SHIPPED | OUTPATIENT
Start: 2023-06-29 | End: 2023-08-18

## 2023-06-29 ASSESSMENT — ENCOUNTER SYMPTOMS
ABDOMINAL PAIN: 0
PALPITATIONS: 0
COUGH: 0
HEMATURIA: 0
HEARTBURN: 0
ARTHRALGIAS: 0
FREQUENCY: 0
NERVOUS/ANXIOUS: 0
HEMATOCHEZIA: 0
CHILLS: 0
DIZZINESS: 0
NAUSEA: 0
SHORTNESS OF BREATH: 0
CONSTIPATION: 0
HEADACHES: 0
DYSURIA: 0
EYE PAIN: 0
SORE THROAT: 0
MYALGIAS: 0
DIARRHEA: 0
JOINT SWELLING: 0
FEVER: 0
BREAST MASS: 0
WEAKNESS: 0
PARESTHESIAS: 0

## 2023-06-29 NOTE — PROGRESS NOTES
SUBJECTIVE:   CC: Brittaney is an 53 year old who presents for preventive health visit.      Healthy Habits:     Getting at least 3 servings of Calcium per day:  Yes    Bi-annual eye exam:  Yes    Dental care twice a year:  Yes    Sleep apnea or symptoms of sleep apnea:  None    Diet:  Regular (no restrictions)    Frequency of exercise:  6-7 days/week    Duration of exercise:  15-30 minutes    Taking medications regularly:  Yes    Medication side effects:  None    PHQ-2 Total Score: 1    Additional concerns today:  No      Walking 20 min daily      Today's PHQ-2 Score:       6/28/2023     9:10 PM   PHQ-2 ( 1999 [employer])   Q1: Little interest or pleasure in doing things 0   Q2: Feeling down, depressed or hopeless 1   PHQ-2 Score 1   Q1: Little interest or pleasure in doing things Not at all   Q2: Feeling down, depressed or hopeless Several days   PHQ-2 Score 1       Have you ever done Advance Care Planning? (For example, a Health Directive, POLST, or a discussion with a medical provider or your loved ones about your wishes): No, advance care planning information given to patient to review.  Patient plans to discuss their wishes with loved ones or provider.      Social History     Tobacco Use     Smoking status: Never     Smokeless tobacco: Never   Substance Use Topics     Alcohol use: Yes     Comment: four per month            6/28/2023     9:10 PM   Alcohol Use   Prescreen: >3 drinks/day or >7 drinks/week? No     Reviewed orders with patient.  Reviewed health maintenance and updated orders accordingly - Yes  Labs reviewed in EPIC    Breast Cancer Screening:    FHS-7:       12/6/2021     2:49 PM 12/28/2022     1:07 PM 6/28/2023     9:12 PM   Breast CA Risk Assessment (FHS-7)   Did any of your first-degree relatives have breast or ovarian cancer? No No No   Did any of your relatives have bilateral breast cancer? No No Yes   Did any man in your family have breast cancer? No No No   Did any woman in your family have  "breast and ovarian cancer? No No No   Did any woman in your family have breast cancer before age 50 y? No No Yes   Do you have 2 or more relatives with breast and/or ovarian cancer? No No No   Do you have 2 or more relatives with breast and/or bowel cancer? No No No       Mammogram Screening: Recommended annual mammography  Pertinent mammograms are reviewed under the imaging tab.    History of abnormal Pap smear: NO - age 30-65 PAP every 5 years with negative HPV co-testing recommended      Latest Ref Rng & Units 10/13/2022     9:07 AM   PAP / HPV   PAP  Atypical squamous cells of undetermined significance (ASC-US)    HPV 16 DNA Negative Negative    HPV 18 DNA Negative Negative    Other HR HPV Negative Negative      Reviewed and updated as needed this visit by clinical staff                  Reviewed and updated as needed this visit by Provider                     Review of Systems   Constitutional: Negative for chills and fever.   HENT: Negative for congestion, ear pain, hearing loss and sore throat.    Eyes: Negative for pain and visual disturbance.   Respiratory: Negative for cough and shortness of breath.    Cardiovascular: Negative for chest pain, palpitations and peripheral edema.   Gastrointestinal: Negative for abdominal pain, constipation, diarrhea, heartburn, hematochezia and nausea.   Breasts:  Negative for tenderness, breast mass and discharge.   Genitourinary: Negative for dysuria, frequency, genital sores, hematuria, pelvic pain, urgency, vaginal bleeding and vaginal discharge.   Musculoskeletal: Negative for arthralgias, joint swelling and myalgias.   Skin: Negative for rash.   Neurological: Negative for dizziness, weakness, headaches and paresthesias.   Psychiatric/Behavioral: Negative for mood changes. The patient is not nervous/anxious.       Has some work stress but states mood overall \"fine\" and denies depression  Preovius right shoulder pain resolved with PT   On hydrochlorathiazide for kidney " "stone hx. No  sx now.  Weight up 5 pounds after prior weight loss. Had been using  [employer] tresa in the past   Hx ASCUS with neg HPV. Had neg colposcopy Nov 20221. Pt states GYN wants to repeat pap 1 year later >foolllwed by [facility]      OBJECTIVE:   /74   Pulse 74   Temp 98.3  F (36.8  C) (Temporal)   Ht 1.664 m (5' 5.5\")   Wt 83.5 kg (184 lb)   SpO2 95%   BMI 30.15 kg/m    Physical Exam  General appearance -   alert, no distress  Skin - No rashes or lesions.  Head - normocephalic, atraumatic  Eyes - MUKESH, EOMI, fundi exam with nondilated pupils negative.  Ears - External ears normal. Canals clear. TM's normal.  Nose/Sinuses - Nares normal. Septum midline. Mucosa normal. No drainage or sinus tenderness.  Oropharynx - No erythema, no adenopathy, no exudates.  Neck - Supple without adenopathy or thyromegaly. No bruits.  Lungs - Clear to auscultation without wheezes/rhonchi.  Heart - Regular rate and rhythm without murmurs, clicks, or gallops.  Nodes - No supraclavicular, axillary, or inguinal adenopathy palpable.  Breasts - deferred  Abdomen - Abdomen mildly obese,  soft, non-tender. BS normal. No masses or hepatosplenomegaly palpable. No bruits.  Extremities -No cyanosis, clubbing or edema.    Musculoskeletal - Spine ROM normal. Muscular strength intact.   Peripheral pulses - radial=4/4, femoral=4/4, posterior tibial=4/4, dorsalis pedis=4/4,  Neuro - Gait normal. Reflexes normal and symmetric. Sensation grossly WNL.  Genital/Rectal - deferred      ASSESSMENT/PLAN:   1. Encounter for routine adult medical exam with abnormal findings   Screening labs as ordered.  Repeat pap with GYN this  November. Counseled re: mild obesity and weight loss. Other HCM UTD. PHQ2 = 1 but pt denies actual depression. States just that work stressful at times and denies need for med/counseling and mood overall good. Appears happy and smiling today  - CBC with platelets; Future  - Comprehensive metabolic panel; " Future  - Lipid panel reflex to direct LDL Fasting; Future  - PRIMARY CARE FOLLOW-UP SCHEDULING; Future    2. Personal history of kidney stones  No sx. Continue med and hydration. Lab as ordered  - hydrochlorothiazide (HYDRODIURIL) 25 MG tablet; Take 1 tablet (25 mg) by mouth daily  Dispense: 90 tablet; Refill: 3  - Comprehensive metabolic panel; Future  - PRIMARY CARE FOLLOW-UP SCHEDULING; Future    3. History of cold sores   Flare approx 2x/year and responds well to med. NO sx now. Continue med prn  - valACYclovir (VALTREX) 1000 mg tablet; Take 2 tablets (2,000 mg) by mouth 2 times daily for 1 day for cold sore outbreaks  Dispense: 28 tablet; Refill: 0  - PRIMARY CARE FOLLOW-UP SCHEDULING; Future    4. Encounter for screening mammogram for breast cancer  Due in late Dec 2023 or later  - *MA Screening Digital Bilateral; Future      Patient has been advised of split billing requirements and indicates understanding: Yes      COUNSELING:  Reviewed preventive health counseling, as reflected in patient instructions        She reports that she has never smoked. She has never used smokeless tobacco.     PLAN:  Continue current medications  Prescriptions refilled.    Labs today as ordered  Reduce calorie/carbohydrate (sugar, bread, potato, pasta, rice, alcohol etc)  intake in diet.  Increase color on your plate with vegetables. Increase  frequency of walking or other aerobic exercise as able (goal is daily)  Covid bivalent booster  and influenza/flu vaccinations in the  Fall (mid October - mid November). May get through a pharmacy or at clinic  Mammogram 12/29/23 or later at [facility]. Call for appt or use [employer]  See GYN this November for repeat pap  Follow up in 1 year. Earlier as needed  Because non-acute appointments to see me in clinic are currently booking out about 3 months at the clinic (for multiple reasons), please use [employer] or call the appointment line 4-5 months in advance of this future appointment with  me.  Pt was informed regarding extra E&M billing for management of new or established medical issues not related to today's wellness/screening visit      Jann Shanks MD  [facility]

## 2023-06-29 NOTE — PATIENT INSTRUCTIONS
Continue current medications  Prescriptions refilled.    Labs today as ordered  Reduce calorie/carbohydrate (sugar, bread, potato, pasta, rice, alcohol etc)  intake in diet.  Increase color on your plate with vegetables. Increase  frequency of walking or other aerobic exercise as able (goal is daily)  Covid bivalent booster  and influenza/flu vaccinations in the  Fall (mid October - mid November). May get through a pharmacy or at clinic  Mammogram 12/29/23 or later at [facility]. Call for appt or use [employer]  See GYN this November for repeat pap  Follow up in 1 year. Earlier as needed  Because non-acute appointments to see me in clinic are currently booking out about 3 months at the clinic (for multiple reasons), please use [employer] or call the appointment line 4-5 months in advance of this future appointment with me.  Pt was informed regarding extra E&M billing for management of new or established medical issues not related to today's wellness/screening visit

## 2023-08-18 DIAGNOSIS — Z87.442 PERSONAL HISTORY OF KIDNEY STONES: ICD-10-CM

## 2023-08-18 RX ORDER — HYDROCHLOROTHIAZIDE 25 MG/1
25 TABLET ORAL DAILY
Qty: 90 TABLET | Refills: 3 | Status: SHIPPED | OUTPATIENT
Start: 2023-08-18 | End: 2024-08-15

## 2023-08-31 ENCOUNTER — HOSPITAL ENCOUNTER (OUTPATIENT)
Dept: ULTRASOUND IMAGING | Facility: CLINIC | Age: 53
Discharge: HOME | End: 2023-08-31
Attending: INTERNAL MEDICINE | Admitting: INTERNAL MEDICINE
Payer: COMMERCIAL

## 2023-08-31 DIAGNOSIS — Q61.5 MEDULLARY CYSTIC KIDNEY: ICD-10-CM

## 2023-08-31 DIAGNOSIS — E83.50 DISORDER OF CALCIUM METABOLISM: ICD-10-CM

## 2023-08-31 DIAGNOSIS — N20.0 NEPHROLITHIASIS: ICD-10-CM

## 2023-08-31 PROCEDURE — 76770 US EXAM ABDO BACK WALL COMP: CPT

## 2023-10-19 ENCOUNTER — LAB REQUISITION (OUTPATIENT)
Dept: LAB | Facility: CLINIC | Age: 53
End: 2023-10-19

## 2023-10-19 DIAGNOSIS — Z01.419 ENCOUNTER FOR GYNECOLOGICAL EXAMINATION (GENERAL) (ROUTINE) WITHOUT ABNORMAL FINDINGS: ICD-10-CM

## 2023-10-19 PROCEDURE — G0145 SCR C/V CYTO,THINLAYER,RESCR: HCPCS | Performed by: OBSTETRICS & GYNECOLOGY

## 2023-10-19 PROCEDURE — 87624 HPV HI-RISK TYP POOLED RSLT: CPT | Performed by: OBSTETRICS & GYNECOLOGY

## 2023-10-24 LAB
BKR LAB AP GYN ADEQUACY: NORMAL
BKR LAB AP GYN INTERPRETATION: NORMAL
BKR LAB AP HPV REFLEX: NORMAL
BKR LAB AP LMP: NORMAL
BKR LAB AP PREVIOUS ABNL DX: NORMAL
BKR LAB AP PREVIOUS ABNORMAL: NORMAL
PATH REPORT.COMMENTS IMP SPEC: NORMAL
PATH REPORT.COMMENTS IMP SPEC: NORMAL
PATH REPORT.RELEVANT HX SPEC: NORMAL

## 2023-11-28 ENCOUNTER — PATIENT OUTREACH (OUTPATIENT)
Dept: CARE COORDINATION | Facility: CLINIC | Age: 53
End: 2023-11-28
Payer: COMMERCIAL

## 2023-12-26 ENCOUNTER — PATIENT OUTREACH (OUTPATIENT)
Dept: CARE COORDINATION | Facility: CLINIC | Age: 53
End: 2023-12-26
Payer: COMMERCIAL

## 2024-02-22 ENCOUNTER — ANCILLARY PROCEDURE (OUTPATIENT)
Dept: MAMMOGRAPHY | Facility: CLINIC | Age: 54
End: 2024-02-22
Attending: INTERNAL MEDICINE
Payer: COMMERCIAL

## 2024-02-22 DIAGNOSIS — Z12.31 VISIT FOR SCREENING MAMMOGRAM: ICD-10-CM

## 2024-02-22 PROCEDURE — 77067 SCR MAMMO BI INCL CAD: CPT | Mod: TC | Performed by: RADIOLOGY

## 2024-02-22 PROCEDURE — 77063 BREAST TOMOSYNTHESIS BI: CPT | Mod: TC | Performed by: RADIOLOGY

## 2024-08-11 ENCOUNTER — HEALTH MAINTENANCE LETTER (OUTPATIENT)
Age: 54
End: 2024-08-11

## 2024-08-14 DIAGNOSIS — Z87.442 PERSONAL HISTORY OF KIDNEY STONES: ICD-10-CM

## 2024-08-15 RX ORDER — HYDROCHLOROTHIAZIDE 25 MG/1
25 TABLET ORAL DAILY
Qty: 90 TABLET | Refills: 1 | Status: SHIPPED | OUTPATIENT
Start: 2024-08-15

## 2024-12-02 ENCOUNTER — MYC REFILL (OUTPATIENT)
Dept: INTERNAL MEDICINE | Facility: CLINIC | Age: 54
End: 2024-12-02
Payer: COMMERCIAL

## 2024-12-02 DIAGNOSIS — Z86.19 HISTORY OF COLD SORES: ICD-10-CM

## 2024-12-03 RX ORDER — VALACYCLOVIR HYDROCHLORIDE 1 G/1
2000 TABLET, FILM COATED ORAL 2 TIMES DAILY
Qty: 28 TABLET | Refills: 0 | Status: SHIPPED | OUTPATIENT
Start: 2024-12-03

## 2024-12-10 SDOH — HEALTH STABILITY: PHYSICAL HEALTH: ON AVERAGE, HOW MANY MINUTES DO YOU ENGAGE IN EXERCISE AT THIS LEVEL?: 30 MIN

## 2024-12-10 SDOH — HEALTH STABILITY: PHYSICAL HEALTH: ON AVERAGE, HOW MANY DAYS PER WEEK DO YOU ENGAGE IN MODERATE TO STRENUOUS EXERCISE (LIKE A BRISK WALK)?: 5 DAYS

## 2024-12-10 ASSESSMENT — SOCIAL DETERMINANTS OF HEALTH (SDOH): HOW OFTEN DO YOU GET TOGETHER WITH FRIENDS OR RELATIVES?: TWICE A WEEK

## 2024-12-11 ENCOUNTER — OFFICE VISIT (OUTPATIENT)
Dept: INTERNAL MEDICINE | Facility: CLINIC | Age: 54
End: 2024-12-11
Payer: COMMERCIAL

## 2024-12-11 VITALS
WEIGHT: 182.4 LBS | DIASTOLIC BLOOD PRESSURE: 71 MMHG | TEMPERATURE: 97.2 F | OXYGEN SATURATION: 99 % | SYSTOLIC BLOOD PRESSURE: 105 MMHG | BODY MASS INDEX: 29.32 KG/M2 | HEART RATE: 84 BPM | HEIGHT: 66 IN

## 2024-12-11 DIAGNOSIS — Z00.01 ENCOUNTER FOR ROUTINE ADULT MEDICAL EXAM WITH ABNORMAL FINDINGS: ICD-10-CM

## 2024-12-11 DIAGNOSIS — M76.62 ACHILLES TENDINITIS OF LEFT LOWER EXTREMITY: ICD-10-CM

## 2024-12-11 DIAGNOSIS — R73.9 BLOOD GLUCOSE ELEVATED: ICD-10-CM

## 2024-12-11 DIAGNOSIS — Z12.31 ENCOUNTER FOR SCREENING MAMMOGRAM FOR BREAST CANCER: ICD-10-CM

## 2024-12-11 DIAGNOSIS — Z86.19 HISTORY OF COLD SORES: ICD-10-CM

## 2024-12-11 DIAGNOSIS — Z87.442 PERSONAL HISTORY OF KIDNEY STONES: ICD-10-CM

## 2024-12-11 LAB
ALBUMIN SERPL BCG-MCNC: 4.2 G/DL (ref 3.5–5.2)
ALP SERPL-CCNC: 82 U/L (ref 40–150)
ALT SERPL W P-5'-P-CCNC: 20 U/L (ref 0–50)
ANION GAP SERPL CALCULATED.3IONS-SCNC: 10 MMOL/L (ref 7–15)
AST SERPL W P-5'-P-CCNC: 20 U/L (ref 0–45)
BILIRUB SERPL-MCNC: 0.3 MG/DL
BUN SERPL-MCNC: 13.2 MG/DL (ref 6–20)
CALCIUM SERPL-MCNC: 9.4 MG/DL (ref 8.8–10.4)
CHLORIDE SERPL-SCNC: 102 MMOL/L (ref 98–107)
CHOLEST SERPL-MCNC: 198 MG/DL
CREAT SERPL-MCNC: 0.91 MG/DL (ref 0.51–0.95)
EGFRCR SERPLBLD CKD-EPI 2021: 74 ML/MIN/1.73M2
ERYTHROCYTE [DISTWIDTH] IN BLOOD BY AUTOMATED COUNT: 12.5 % (ref 10–15)
FASTING STATUS PATIENT QL REPORTED: YES
FASTING STATUS PATIENT QL REPORTED: YES
GLUCOSE SERPL-MCNC: 110 MG/DL (ref 70–99)
HCO3 SERPL-SCNC: 29 MMOL/L (ref 22–29)
HCT VFR BLD AUTO: 43.3 % (ref 35–47)
HDLC SERPL-MCNC: 42 MG/DL
HGB BLD-MCNC: 14.8 G/DL (ref 11.7–15.7)
LDLC SERPL CALC-MCNC: 139 MG/DL
MCH RBC QN AUTO: 30.6 PG (ref 26.5–33)
MCHC RBC AUTO-ENTMCNC: 34.2 G/DL (ref 31.5–36.5)
MCV RBC AUTO: 90 FL (ref 78–100)
NONHDLC SERPL-MCNC: 156 MG/DL
PLATELET # BLD AUTO: 311 10E3/UL (ref 150–450)
POTASSIUM SERPL-SCNC: 3.6 MMOL/L (ref 3.4–5.3)
PROT SERPL-MCNC: 7.4 G/DL (ref 6.4–8.3)
RBC # BLD AUTO: 4.84 10E6/UL (ref 3.8–5.2)
SODIUM SERPL-SCNC: 141 MMOL/L (ref 135–145)
TRIGL SERPL-MCNC: 83 MG/DL
WBC # BLD AUTO: 7.5 10E3/UL (ref 4–11)

## 2024-12-11 PROCEDURE — 36415 COLL VENOUS BLD VENIPUNCTURE: CPT | Performed by: INTERNAL MEDICINE

## 2024-12-11 PROCEDURE — 80061 LIPID PANEL: CPT | Performed by: INTERNAL MEDICINE

## 2024-12-11 PROCEDURE — 83036 HEMOGLOBIN GLYCOSYLATED A1C: CPT | Performed by: INTERNAL MEDICINE

## 2024-12-11 PROCEDURE — 99396 PREV VISIT EST AGE 40-64: CPT | Performed by: INTERNAL MEDICINE

## 2024-12-11 PROCEDURE — 85027 COMPLETE CBC AUTOMATED: CPT | Performed by: INTERNAL MEDICINE

## 2024-12-11 PROCEDURE — 80053 COMPREHEN METABOLIC PANEL: CPT | Performed by: INTERNAL MEDICINE

## 2024-12-11 PROCEDURE — 99213 OFFICE O/P EST LOW 20 MIN: CPT | Mod: 25 | Performed by: INTERNAL MEDICINE

## 2024-12-11 RX ORDER — HYDROCHLOROTHIAZIDE 25 MG/1
25 TABLET ORAL DAILY
Qty: 90 TABLET | Refills: 3 | Status: SHIPPED | OUTPATIENT
Start: 2024-12-11

## 2024-12-11 RX ORDER — VALACYCLOVIR HYDROCHLORIDE 1 G/1
2000 TABLET, FILM COATED ORAL 2 TIMES DAILY
Qty: 28 TABLET | Refills: 0 | Status: CANCELLED | OUTPATIENT
Start: 2024-12-11

## 2024-12-11 NOTE — PATIENT INSTRUCTIONS
Continue current medications  Prescriptions refilled on file at your pharmacy to be filled in the future when needed  Labs today as ordered  Continue diet and exercise  Mammogram 2/23/25 or later. This will be done at the [facility]. Call [phone] or use [employer] to schedule.   Stretching left achilles tendon in AM before getting out of bed and after prolonged sitting. May use OTC Voltaren gel applied to area of soreness as needed  See GYN next week as scheduled for pelvic exam   Healthcare  Directive discussed and given copy.   Patient to complete and return to clinic  Follow up in 1 year.  Earlier as needed  Pt was informed regarding extra E&M billing for management of new or established medical issues not related to today's wellness/screening visit

## 2024-12-11 NOTE — PROGRESS NOTES
Preventive Care Visit  [facility]  Jann Shanks MD, Internal Medicine  Dec 11, 2024       ASSESSMENT:    1. Encounter for routine adult medical exam with abnormal findings  Has gynecology appointment scheduled in the next week.  Other healthcare maintenance up-to-date for age  - Comprehensive metabolic panel; Future  - CBC with platelets; Future  - Lipid panel reflex to direct LDL Fasting; Future  - Comprehensive metabolic panel  - CBC with platelets  - Lipid panel reflex to direct LDL Fasting    2. History of cold sores  Rare onset of cold sore orally.  Currently asymptomatic.  Continuing valacyclovir as needed with outbreak    3. Personal history of kidney stones  Asymptomatic.  Continue medical management.  Labs ordered  - hydrochlorothiazide (HYDRODIURIL) 25 MG tablet; Take 1 tablet (25 mg) by mouth daily.  Dispense: 90 tablet; Refill: 3  - Comprehensive metabolic panel; Future  - Comprehensive metabolic panel    4. Encounter for screening mammogram for breast cancer  Due for mammogram regularly 2/23/2025 or later.  Currently asymptomatic  - MA Screen Bilateral w/Joseph; Future    5. Achilles tendinitis of left lower extremity  Mild symptoms after prolonged rest that resolves with walking activity.  Counseled regarding stretching of the left Achilles tendon.  Topical Voltaren as needed      PLAN:  Continue current medications  Prescriptions refilled on file at your pharmacy to be filled in the future when needed  Labs today as ordered  Continue diet and exercise  Mammogram 2/23/25 or later. This will be done at the [facility]. Call [phone] or use [employer] to schedule.   Stretching left achilles tendon in AM before getting out of bed and after prolonged sitting. May use OTC Voltaren gel applied to area of soreness as needed  See GYN next week as scheduled for pelvic exam   Healthcare  Directive discussed and given copy.   Patient to complete and return to clinic  Follow up  in 1 year.  Earlier as needed  Pt was informed regarding extra E&M billing for management of new or established medical issues not related to today's wellness/screening visit           Carin Quispe is a 54 year old, presenting for the following:  Physical  And follow-up regarding medical issues as above        HPI     Health Care Directive  Patient does not have a Health Care Directive: Discussed advance care planning with patient; information given to patient to review.      12/10/2024   General Health   How would you rate your overall physical health? Good   Feel stress (tense, anxious, or unable to sleep) To some extent      (!) STRESS CONCERN      12/10/2024   Nutrition   Three or more servings of calcium each day? Yes   Diet: Regular (no restrictions)   How many servings of fruit and vegetables per day? (!) 2-3   How many sweetened beverages each day? 0-1            12/10/2024   Exercise   Days per week of moderate/strenous exercise 5 days   Average minutes spent exercising at this level 30 min            12/10/2024   Social Factors   Frequency of gathering with friends or relatives Twice a week   Worry food won't last until get money to buy more No   Food not last or not have enough money for food? No   Do you have housing? (Housing is defined as stable permanent housing and does not include staying ouside in a car, in a tent, in an abandoned building, in an overnight shelter, or couch-surfing.) No   Are you worried about losing your housing? No   Lack of transportation? No   Unable to get utilities (heat,electricity)? No   Want help with housing or utility concern? No      (!) HOUSING CONCERN PRESENT      12/10/2024   Fall Risk   Fallen 2 or more times in the past year? No    Trouble with walking or balance? No        Patient-reported          12/10/2024   Dental   Dentist two times every year? Yes            12/10/2024   TB Screening   Were you born outside of the [place]? No            Today's PHQ-2  Score:       12/10/2024     3:09 PM   PHQ-2 ( 1999 Pf[employer])   Q1: Little interest or pleasure in doing things 1    Q2: Feeling down, depressed or hopeless 1    PHQ-2 Score 2    Q1: Little interest or pleasure in doing things Several days   Q2: Feeling down, depressed or hopeless Several days   PHQ-2 Score 2       Patient-reported           12/10/2024   Substance Use   Alcohol more than 3/day or more than 7/wk No   Do you use any other substances recreationally? No        Social History     Tobacco Use    Smoking status: Never    Smokeless tobacco: Never   Substance Use Topics    Alcohol use: Yes     Comment: four per month    Drug use: No            2/22/2024   LAST FHS-7 RESULTS   1st degree relative breast or ovarian cancer No   Any relative bilateral breast cancer No   Any male have breast cancer No   Any ONE woman have BOTH breast AND ovarian cancer No   Any woman with breast cancer before 50yrs No   2 or more relatives with breast AND/OR ovarian cancer No   2 or more relatives with breast AND/OR bowel cancer No         Mammogram Screening - Mammogram every 1-2 years updated in Health Maintenance based on mutual decision making        12/10/2024   STI Screening   New sexual partner(s) since last STI/HIV test? No        History of abnormal Pap smear: No - age 65 or older with pap indicated due to inadequate prior screening (3 consecutive negative cytology results, 2 consecutive negative cotesting results, or 2 consecutive negative HrHPV test results within 10 years, with the most recent test occurring within the recommended screening interval for the test used). Has pap/pelvic exam appt  scheduled next week with [facility] consult         Latest Ref Rng & Units 10/19/2023     4:34 PM 10/13/2022     9:07 AM 3/13/2019     3:25 PM   PAP / HPV   PAP  Negative for Intraepithelial Lesion or Malignancy (NILM)  Atypical squamous cells of undetermined significance (ASC-US)     HPV 16 DNA Negative Negative  Negative      HPV 18 DNA Negative Negative  Negative     Other HR HPV Negative Negative  Negative     PAP-ABSTRACT    See Scanned Document           This result is from an external source.     ASCVD Risk   The 10-year ASCVD risk score (Amita FREGOSO, et al., 2019) is: 2%    Values used to calculate the score:      Age: 54 years      Sex: Female      Is Non- : No      Diabetic: No      Tobacco smoker: No      Systolic Blood Pressure: 105 mmHg      Is BP treated: No      HDL Cholesterol: 35 mg/dL      Total Cholesterol: 195 mg/dL     DEXA not due yet based on  age    Pt's past medical history, family history, habits, medications and allergies were reviewed with the patient today.   Most recent lab results reviewed with pt. Problem list and histories reviewed & adjusted, as indicated. ***      Review of Systems  CONSTITUTIONAL: NEGATIVE for fever, chills. Weight down  2 pounds. Occ use [employer]  INTEGUMENTARY/SKIN: NEGATIVE for worrisome current rashes, moles or lesions. Rare cold sores orally  EYES: NEGATIVE for vision changes or irritation with glasses.  Eye exam 1 year ago  ENT/MOUTH: NEGATIVE for ear, mouth and throat problems  RESP: NEGATIVE for significant cough or SOB  BREAST: NEGATIVE for masses, tenderness or discharge  CV: NEGATIVE for chest pain, palpitations or peripheral edema  GI: NEGATIVE for nausea, abdominal pain, heartburn, or change in bowel habits  : NEGATIVE for frequency, dysuria, or hematuria., No vagina  sx. Has GYN  appt next week for repeat pap  MUSCULOSKELETAL: NEGATIVE for significant arthralgias or myalgia except for occ left achilles tenderness when first walking  and then resolves with more activity.  Symptoms started a couple months ago after a long walk with a friend.  Mild in severity and intermittent  NEURO: NEGATIVE for weakness, dizziness or paresthesias  ENDOCRINE: NEGATIVE for temperature intolerance   HEME: NEGATIVE for bleeding problems  PSYCHIATRIC:  "NEGATIVE for changes in mood or affect     Objective    Exam  /71   Pulse 84   Temp 97.2  F (36.2  C) (Temporal)   Ht 1.664 m (5' 5.5\")   Wt 82.7 kg (182 lb 6.4 oz)   SpO2 99%   BMI 29.89 kg/m     Estimated body mass index is 29.89 kg/m  as calculated from the following:    Height as of this encounter: 1.664 m (5' 5.5\").    Weight as of this encounter: 82.7 kg (182 lb 6.4 oz).    Physical Exam  General appearance -  alert, no distress  Skin - No rashes or lesions.  Head - normocephalic, atraumatic  Eyes - MUKESH, EOMI, fundi exam with nondilated pupils negative.  Ears - External ears normal. Canals clear. TM's normal.  Nose/Sinuses - Nares normal. Septum midline. Mucosa normal. No drainage or sinus tenderness.  Oropharynx - No erythema, no adenopathy, no exudates.  Neck - Supple without adenopathy or thyromegaly. No bruits.  Lungs - Clear to auscultation without wheezes/rhonchi.  Heart - Regular rate and rhythm without murmurs, clicks, or gallops.  Nodes - No supraclavicular, axillary, or inguinal adenopathy palpable.  Breasts - deferred  Abdomen - Abdomen soft, non-tender. BS normal. No masses or hepatosplenomegaly palpable. No bruits.  Extremities -No cyanosis, clubbing or edema.    Musculoskeletal - Spine ROM normal. Muscular strength intact.  Minimal tenderness to palpation left Achilles tendon.  No heel tenderness to palpation.  Ankle range of motion intact to flexion/extension  Peripheral pulses - radial=4/4, femoral=4/4, posterior tibial=4/4, dorsalis pedis=4/4,  Neuro - Gait normal. Reflexes normal and symmetric. Sensation grossly WNL.  Genital/Rectal - deferred          Signed Electronically by: Jann Shanks MD         "

## 2024-12-13 LAB
EST. AVERAGE GLUCOSE BLD GHB EST-MCNC: 128 MG/DL
HBA1C MFR BLD: 6.1 % (ref 0–5.6)

## 2024-12-18 ENCOUNTER — LAB REQUISITION (OUTPATIENT)
Dept: LAB | Facility: CLINIC | Age: 54
End: 2024-12-18
Payer: COMMERCIAL

## 2024-12-18 DIAGNOSIS — Z12.4 ENCOUNTER FOR SCREENING FOR MALIGNANT NEOPLASM OF CERVIX: ICD-10-CM

## 2024-12-18 PROCEDURE — 87624 HPV HI-RISK TYP POOLED RSLT: CPT | Performed by: MIDWIFE

## 2024-12-18 PROCEDURE — G0145 SCR C/V CYTO,THINLAYER,RESCR: HCPCS | Performed by: MIDWIFE

## 2024-12-19 LAB
HPV HR 12 DNA CVX QL NAA+PROBE: NEGATIVE
HPV16 DNA CVX QL NAA+PROBE: NEGATIVE
HPV18 DNA CVX QL NAA+PROBE: NEGATIVE
HUMAN PAPILLOMA VIRUS FINAL DIAGNOSIS: NORMAL

## 2024-12-23 LAB
BKR AP ASSOCIATED HPV REPORT: NORMAL
BKR LAB AP GYN ADEQUACY: NORMAL
BKR LAB AP GYN INTERPRETATION: NORMAL
BKR LAB AP LMP: NORMAL
BKR LAB AP PREVIOUS ABNL DX: NORMAL
BKR LAB AP PREVIOUS ABNORMAL: NORMAL
PATH REPORT.COMMENTS IMP SPEC: NORMAL
PATH REPORT.COMMENTS IMP SPEC: NORMAL
PATH REPORT.RELEVANT HX SPEC: NORMAL

## 2025-02-28 ENCOUNTER — ANCILLARY PROCEDURE (OUTPATIENT)
Dept: MAMMOGRAPHY | Facility: CLINIC | Age: 55
End: 2025-02-28
Attending: INTERNAL MEDICINE
Payer: COMMERCIAL

## 2025-02-28 DIAGNOSIS — Z12.31 VISIT FOR SCREENING MAMMOGRAM: ICD-10-CM

## 2025-02-28 DIAGNOSIS — Z12.31 ENCOUNTER FOR SCREENING MAMMOGRAM FOR BREAST CANCER: ICD-10-CM

## 2025-02-28 PROCEDURE — 77067 SCR MAMMO BI INCL CAD: CPT | Mod: TC | Performed by: STUDENT IN AN ORGANIZED HEALTH CARE EDUCATION/TRAINING PROGRAM

## 2025-02-28 PROCEDURE — 77063 BREAST TOMOSYNTHESIS BI: CPT | Mod: TC | Performed by: STUDENT IN AN ORGANIZED HEALTH CARE EDUCATION/TRAINING PROGRAM

## 2025-03-10 ASSESSMENT — ACTIVITIES OF DAILY LIVING (ADL)
SQUATTING: NO DIFFICULTY
LEFS_SCORE(%): 0
GETTING_INTO_OR_OUT_OF_A_CAR: NO DIFFICULTY
SHOPPING: QUITE A BIT OF DIFFICULTY
GOING_UP_OR_DOWN_10_STAIRS: A LITTLE BIT OF DIFFICULTY
WALKING_BETWEEN_ROOMS: A LITTLE BIT OF DIFFICULTY
PUTTING_ON_YOUR_SHOES_OR_SOCKS: NO DIFFICULTY
WALKING_2_BLOCKS: MODERATE DIFFICULTY
ANY_OF_YOUR_USUAL_WORK,_HOUSEWORK_OR_SCHOOL_ACTIVITIES: A LITTLE BIT OF DIFFICULTY
GETTING_INTO_AND_OUT_OF_A_BATH: A LITTLE BIT OF DIFFICULTY
LEFS_RAW_SCORE: 0
LIFTING_AN_OBJECT,_LIKE_A_BAG_OF_GROCERIES_FROM_THE_FLOOR: NO DIFFICULTY
RUNNING_ON_EVEN_GROUND: MODERATE DIFFICULTY
RUNNING_ON_UNEVEN_GROUND: MODERATE DIFFICULTY
STANDING_FOR_1_HOUR: A LITTLE BIT OF DIFFICULTY
ROLLING_OVER_IN_BED: NO DIFFICULTY
SITTING_FOR_1_HOUR: NO DIFFICULTY
WALKING_A_MILE: MODERATE DIFFICULTY
MAKING_SHARP_TURNS_WHILE_RUNNING_FAST: MODERATE DIFFICULTY
PLEASE_INDICATE_YOR_PRIMARY_REASON_FOR_REFERRAL_TO_THERAPY:: FOOT AND/OR ANKLE
YOUR_USUAL_HOBBIES,_RECREATIONAL_OR_SPORTING_ACTIVITIES: MODERATE DIFFICULTY
PERFORMING_HEAVY_ACTIVITIES_AROUND_YOUR_HOME: NO DIFFICULTY
PERFORMING_LIGHT_ACTIVITIES_AROUND_YOUR_HOME: NO DIFFICULTY

## 2025-03-11 ENCOUNTER — THERAPY VISIT (OUTPATIENT)
Dept: PHYSICAL THERAPY | Facility: CLINIC | Age: 55
End: 2025-03-11
Attending: INTERNAL MEDICINE
Payer: COMMERCIAL

## 2025-03-11 DIAGNOSIS — M76.62 ACHILLES TENDINITIS OF LEFT LOWER EXTREMITY: ICD-10-CM

## 2025-03-11 DIAGNOSIS — G89.29 CHRONIC PAIN OF LEFT HEEL: Primary | ICD-10-CM

## 2025-03-11 DIAGNOSIS — M79.672 CHRONIC PAIN OF LEFT HEEL: Primary | ICD-10-CM

## 2025-03-11 PROCEDURE — 97161 PT EVAL LOW COMPLEX 20 MIN: CPT | Mod: GP | Performed by: PHYSICAL THERAPIST

## 2025-03-11 PROCEDURE — 97110 THERAPEUTIC EXERCISES: CPT | Mod: GP | Performed by: PHYSICAL THERAPIST

## 2025-03-11 NOTE — PROGRESS NOTES
"PHYSICAL THERAPY EVALUATION  Type of Visit: Evaluation       Fall Risk Screen:  Fall screen completed by: PT  Have you fallen 2 or more times in the past year?: No  Have you fallen and had an injury in the past year?: No  Is patient a fall risk?: No    Subjective         Presenting condition or subjective complaint:    Date of onset: 09/11/24    Relevant medical history:   n/a  Dates & types of surgery:  n/a    Prior diagnostic imaging/testing results:     none  Prior therapy history for the same diagnosis, illness or injury: No      Prior Level of Function  Transfers: Independent  Ambulation: Independent  ADL: Independent      Living Environment  Social support: With a significant other or spouse   Type of home: House   Stairs to enter the home: Yes   Is there a railing: Yes     Ramp: No   Stairs inside the home: No       Help at home: None  Equipment owned:       Employment: Yes   FT - office, has sit-stand  Hobbies/Interests:  walk, hiking, house projects     Patient goals for therapy:  walk without discomfort    Patient had onset of left achilles area symptoms about 6 months ago after a \"brisk walk\", faster than her usual.  After the walk she had \"stiffness/pinching\" back of the left heel.  She continues to have \"stiffness\" with first steps in the  a.m. and after sitting, and when starting out on a walk.  Symptoms were not changing until exacerbation about a week ago after standing 7 hours without supportive shoes.  She had pain posterior heel.  Symptoms range 0-2/10, after exacerbation had constant \"aching\" and was unable to stand comfortably for a few days.  Symptoms increase with first steps in the a.m. and after sitting, initiating her walk, down>up stairs (nonreciprocal down first thing in a.m., improves as day goes on).  Symptoms decrease with wearing a small heel, \"warming up\" after first few steps of her walks.  She has not had these symptoms in the past.  She walks 25' daily, and " "has tried gastrocnemius stretching without relief.         Objective   Alignment  Low arch height bilaterally     Integumentary/edema  Unremarkable     Gait  Assistive device: None   Deviations:  Prolonged pronation, does not fully resupinate prior to heel strike     Palpation  Tenderness left achilles insertion/posterior calcaneus and left retrocalcaneal bursa     AROM/PROM   AROM Right AROM Left   Ankle DF-PF 8-0-72 6-0-72, \"tight\" feeling with DF   Inversion WNL WNL   Eversion WNL WNL   Great toe ext Min loss with PROM Min loss with PROM      Strength  WNL bilateral ankle inversion, eversion and DF, PF on right, PT 4+/5 on left with discomfort     Ligamentous testing  negative    Single-leg stance   30+ seconds right and left with good control    Assessment & Plan   CLINICAL IMPRESSIONS  Medical Diagnosis: Left achilles tendonitis    Treatment Diagnosis: Left achilles pain   Impression/Assessment: Patient is a 54 year old female with left achilles pain and stiffness complaints.  The following significant findings have been identified: Pain, Decreased ROM/flexibility, Decreased strength, and Decreased activity tolerance. These impairments interfere with their ability to perform recreational activities, household mobility, and community mobility as compared to previous level of function.     Clinical Decision Making (Complexity):  Clinical Presentation: Stable/Uncomplicated  Clinical Presentation Rationale: based on medical and personal factors listed in PT evaluation  Clinical Decision Making (Complexity): Low complexity    PLAN OF CARE  Treatment Interventions:  Interventions: Manual Therapy, Therapeutic Exercise, Self-Care/Home Management, ultrasound    Long Term Goals     PT Goal 1  Goal Identifier: Ambulation  Goal Description: No symptoms with first steps in a.m. or after sitting  Rationale: to maximize safety and independence with performance of ADLs and functional tasks;to maximize safety and independence " within the home;to maximize safety and independence within the community;to maximize safety and independence with self cares  Target Date: 05/06/25      Frequency of Treatment: 1x/week for 4 weeks then 2x/month for 2 visits  Duration of Treatment: 8 weeks    Education Assessment:        Risks and benefits of evaluation/treatment have been explained.   Patient/Family/caregiver agrees with Plan of Care.     Evaluation Time:     PT Eval, Low Complexity Minutes (05096): 25       Signing Clinician: Yelitza Donis PT

## 2025-03-24 ENCOUNTER — THERAPY VISIT (OUTPATIENT)
Dept: PHYSICAL THERAPY | Facility: CLINIC | Age: 55
End: 2025-03-24
Attending: INTERNAL MEDICINE
Payer: COMMERCIAL

## 2025-03-24 DIAGNOSIS — M79.672 CHRONIC PAIN OF LEFT HEEL: Primary | ICD-10-CM

## 2025-03-24 DIAGNOSIS — G89.29 CHRONIC PAIN OF LEFT HEEL: Primary | ICD-10-CM

## 2025-03-24 PROCEDURE — 97035 APP MDLTY 1+ULTRASOUND EA 15: CPT | Mod: GP | Performed by: PHYSICAL THERAPIST

## 2025-03-24 PROCEDURE — 97110 THERAPEUTIC EXERCISES: CPT | Mod: GP | Performed by: PHYSICAL THERAPIST

## 2025-04-08 ENCOUNTER — THERAPY VISIT (OUTPATIENT)
Dept: PHYSICAL THERAPY | Facility: CLINIC | Age: 55
End: 2025-04-08
Payer: COMMERCIAL

## 2025-04-08 DIAGNOSIS — G89.29 CHRONIC PAIN OF LEFT HEEL: Primary | ICD-10-CM

## 2025-04-08 DIAGNOSIS — M79.672 CHRONIC PAIN OF LEFT HEEL: Primary | ICD-10-CM

## 2025-04-08 PROCEDURE — 97140 MANUAL THERAPY 1/> REGIONS: CPT | Mod: GP | Performed by: PHYSICAL THERAPIST

## 2025-04-08 PROCEDURE — 97110 THERAPEUTIC EXERCISES: CPT | Mod: GP | Performed by: PHYSICAL THERAPIST
